# Patient Record
Sex: FEMALE | Race: WHITE | NOT HISPANIC OR LATINO | Employment: FULL TIME | URBAN - NONMETROPOLITAN AREA
[De-identification: names, ages, dates, MRNs, and addresses within clinical notes are randomized per-mention and may not be internally consistent; named-entity substitution may affect disease eponyms.]

---

## 2017-06-05 ENCOUNTER — HOSPITAL ENCOUNTER (OUTPATIENT)
Facility: HOSPITAL | Age: 37
Setting detail: OBSERVATION
Discharge: HOME | End: 2017-06-06
Attending: EMERGENCY MEDICINE | Admitting: HOSPITALIST

## 2017-06-05 ENCOUNTER — APPOINTMENT (OUTPATIENT)
Dept: GENERAL RADIOLOGY | Facility: HOSPITAL | Age: 37
End: 2017-06-05

## 2017-06-05 DIAGNOSIS — R07.9 CHEST PAIN OF UNCERTAIN ETIOLOGY: Primary | ICD-10-CM

## 2017-06-05 LAB
ALBUMIN SERPL-MCNC: 4.2 G/DL (ref 3.5–5)
ALBUMIN/GLOB SERPL: 1.3 G/DL (ref 1.5–2.5)
ALP SERPL-CCNC: 54 U/L (ref 35–104)
ALT SERPL W P-5'-P-CCNC: 14 U/L (ref 10–36)
AMYLASE SERPL-CCNC: 75 U/L (ref 28–100)
ANION GAP SERPL CALCULATED.3IONS-SCNC: 4 MMOL/L (ref 3.6–11.2)
AST SERPL-CCNC: 24 U/L (ref 10–30)
BASOPHILS # BLD AUTO: 0.07 10*3/MM3 (ref 0–0.3)
BASOPHILS NFR BLD AUTO: 0.9 % (ref 0–2)
BILIRUB SERPL-MCNC: 1.2 MG/DL (ref 0.2–1.8)
BUN BLD-MCNC: 14 MG/DL (ref 7–21)
BUN/CREAT SERPL: 19.2 (ref 7–25)
CALCIUM SPEC-SCNC: 9.6 MG/DL (ref 7.7–10)
CHLORIDE SERPL-SCNC: 111 MMOL/L (ref 99–112)
CK MB SERPL-CCNC: 0.21 NG/ML (ref 0–5)
CK MB SERPL-CCNC: <0.18 NG/ML (ref 0–5)
CK MB SERPL-RTO: 0.4 % (ref 0–3)
CK MB SERPL-RTO: NORMAL % (ref 0–3)
CK SERPL-CCNC: 44 U/L (ref 24–173)
CK SERPL-CCNC: 52 U/L (ref 24–173)
CO2 SERPL-SCNC: 26 MMOL/L (ref 24.3–31.9)
CREAT BLD-MCNC: 0.73 MG/DL (ref 0.43–1.29)
D DIMER PPP FEU-MCNC: 0.28 MG/L (FEU) (ref 0–0.5)
DEPRECATED RDW RBC AUTO: 44.1 FL (ref 37–54)
EOSINOPHIL # BLD AUTO: 0.19 10*3/MM3 (ref 0–0.7)
EOSINOPHIL NFR BLD AUTO: 2.4 % (ref 0–5)
ERYTHROCYTE [DISTWIDTH] IN BLOOD BY AUTOMATED COUNT: 13.7 % (ref 11.5–14.5)
GFR SERPL CREATININE-BSD FRML MDRD: 90 ML/MIN/1.73
GLOBULIN UR ELPH-MCNC: 3.2 GM/DL
GLUCOSE BLD-MCNC: 97 MG/DL (ref 70–110)
HBA1C MFR BLD: 5.3 % (ref 4.5–5.7)
HCT VFR BLD AUTO: 42.2 % (ref 37–47)
HGB BLD-MCNC: 14.3 G/DL (ref 12–16)
IMM GRANULOCYTES # BLD: 0.03 10*3/MM3 (ref 0–0.03)
IMM GRANULOCYTES NFR BLD: 0.4 % (ref 0–0.5)
LIPASE SERPL-CCNC: 39 U/L (ref 13–60)
LYMPHOCYTES # BLD AUTO: 2.1 10*3/MM3 (ref 1–3)
LYMPHOCYTES NFR BLD AUTO: 26.1 % (ref 21–51)
MCH RBC QN AUTO: 30.2 PG (ref 27–33)
MCHC RBC AUTO-ENTMCNC: 33.9 G/DL (ref 33–37)
MCV RBC AUTO: 89.2 FL (ref 80–94)
MONOCYTES # BLD AUTO: 0.46 10*3/MM3 (ref 0.1–0.9)
MONOCYTES NFR BLD AUTO: 5.7 % (ref 0–10)
MYOGLOBIN SERPL-MCNC: 16 NG/ML (ref 0–109)
MYOGLOBIN SERPL-MCNC: 20 NG/ML (ref 0–109)
NEUTROPHILS # BLD AUTO: 5.19 10*3/MM3 (ref 1.4–6.5)
NEUTROPHILS NFR BLD AUTO: 64.5 % (ref 30–70)
OSMOLALITY SERPL CALC.SUM OF ELEC: 281.6 MOSM/KG (ref 273–305)
PLATELET # BLD AUTO: 198 10*3/MM3 (ref 130–400)
PMV BLD AUTO: 11.4 FL (ref 6–10)
POTASSIUM BLD-SCNC: 3.8 MMOL/L (ref 3.5–5.3)
PROT SERPL-MCNC: 7.4 G/DL (ref 6–8)
RBC # BLD AUTO: 4.73 10*6/MM3 (ref 4.2–5.4)
SODIUM BLD-SCNC: 141 MMOL/L (ref 135–153)
TROPONIN I SERPL-MCNC: <0.006 NG/ML
TROPONIN I SERPL-MCNC: <0.006 NG/ML
WBC NRBC COR # BLD: 8.04 10*3/MM3 (ref 4.5–12.5)

## 2017-06-05 PROCEDURE — 93010 ELECTROCARDIOGRAM REPORT: CPT | Performed by: INTERNAL MEDICINE

## 2017-06-05 PROCEDURE — 99220 PR INITIAL OBSERVATION CARE/DAY 70 MINUTES: CPT | Performed by: HOSPITALIST

## 2017-06-05 PROCEDURE — G0378 HOSPITAL OBSERVATION PER HR: HCPCS

## 2017-06-05 PROCEDURE — 93005 ELECTROCARDIOGRAM TRACING: CPT | Performed by: EMERGENCY MEDICINE

## 2017-06-05 PROCEDURE — 71010 HC CHEST PA OR AP: CPT

## 2017-06-05 PROCEDURE — 85379 FIBRIN DEGRADATION QUANT: CPT | Performed by: EMERGENCY MEDICINE

## 2017-06-05 PROCEDURE — 82150 ASSAY OF AMYLASE: CPT | Performed by: EMERGENCY MEDICINE

## 2017-06-05 PROCEDURE — 99285 EMERGENCY DEPT VISIT HI MDM: CPT

## 2017-06-05 PROCEDURE — 82550 ASSAY OF CK (CPK): CPT | Performed by: EMERGENCY MEDICINE

## 2017-06-05 PROCEDURE — 83036 HEMOGLOBIN GLYCOSYLATED A1C: CPT | Performed by: HOSPITALIST

## 2017-06-05 PROCEDURE — 80053 COMPREHEN METABOLIC PANEL: CPT | Performed by: EMERGENCY MEDICINE

## 2017-06-05 PROCEDURE — 83874 ASSAY OF MYOGLOBIN: CPT | Performed by: EMERGENCY MEDICINE

## 2017-06-05 PROCEDURE — 71010 XR CHEST 1 VW: CPT | Performed by: RADIOLOGY

## 2017-06-05 PROCEDURE — 96361 HYDRATE IV INFUSION ADD-ON: CPT

## 2017-06-05 PROCEDURE — 83690 ASSAY OF LIPASE: CPT | Performed by: EMERGENCY MEDICINE

## 2017-06-05 PROCEDURE — 25010000002 LORAZEPAM PER 2 MG: Performed by: EMERGENCY MEDICINE

## 2017-06-05 PROCEDURE — 85025 COMPLETE CBC W/AUTO DIFF WBC: CPT | Performed by: EMERGENCY MEDICINE

## 2017-06-05 PROCEDURE — 25010000002 HEPARIN (PORCINE) PER 1000 UNITS: Performed by: HOSPITALIST

## 2017-06-05 PROCEDURE — 96372 THER/PROPH/DIAG INJ SC/IM: CPT

## 2017-06-05 PROCEDURE — 82553 CREATINE MB FRACTION: CPT | Performed by: EMERGENCY MEDICINE

## 2017-06-05 PROCEDURE — 84484 ASSAY OF TROPONIN QUANT: CPT | Performed by: EMERGENCY MEDICINE

## 2017-06-05 PROCEDURE — 96374 THER/PROPH/DIAG INJ IV PUSH: CPT

## 2017-06-05 RX ORDER — MORPHINE SULFATE 2 MG/ML
1 INJECTION, SOLUTION INTRAMUSCULAR; INTRAVENOUS EVERY 4 HOURS PRN
Status: DISCONTINUED | OUTPATIENT
Start: 2017-06-05 | End: 2017-06-06 | Stop reason: HOSPADM

## 2017-06-05 RX ORDER — SODIUM CHLORIDE 0.9 % (FLUSH) 0.9 %
1-10 SYRINGE (ML) INJECTION AS NEEDED
Status: DISCONTINUED | OUTPATIENT
Start: 2017-06-05 | End: 2017-06-06 | Stop reason: HOSPADM

## 2017-06-05 RX ORDER — ACETAMINOPHEN 325 MG/1
650 TABLET ORAL EVERY 6 HOURS PRN
Status: DISCONTINUED | OUTPATIENT
Start: 2017-06-05 | End: 2017-06-06 | Stop reason: HOSPADM

## 2017-06-05 RX ORDER — NALOXONE HCL 0.4 MG/ML
0.4 VIAL (ML) INJECTION
Status: DISCONTINUED | OUTPATIENT
Start: 2017-06-05 | End: 2017-06-06 | Stop reason: HOSPADM

## 2017-06-05 RX ORDER — SODIUM CHLORIDE 9 MG/ML
75 INJECTION, SOLUTION INTRAVENOUS CONTINUOUS
Status: DISCONTINUED | OUTPATIENT
Start: 2017-06-05 | End: 2017-06-06 | Stop reason: HOSPADM

## 2017-06-05 RX ORDER — NITROGLYCERIN 0.4 MG/1
0.4 TABLET SUBLINGUAL
Status: DISCONTINUED | OUTPATIENT
Start: 2017-06-05 | End: 2017-06-06 | Stop reason: HOSPADM

## 2017-06-05 RX ORDER — SODIUM CHLORIDE 9 MG/ML
75 INJECTION, SOLUTION INTRAVENOUS CONTINUOUS
Status: DISCONTINUED | OUTPATIENT
Start: 2017-06-05 | End: 2017-06-06

## 2017-06-05 RX ORDER — ONDANSETRON 2 MG/ML
4 INJECTION INTRAMUSCULAR; INTRAVENOUS EVERY 6 HOURS PRN
Status: DISCONTINUED | OUTPATIENT
Start: 2017-06-05 | End: 2017-06-06 | Stop reason: HOSPADM

## 2017-06-05 RX ORDER — LORAZEPAM 2 MG/ML
INJECTION INTRAMUSCULAR
Status: DISPENSED
Start: 2017-06-05 | End: 2017-06-06

## 2017-06-05 RX ORDER — HEPARIN SODIUM 5000 [USP'U]/ML
5000 INJECTION, SOLUTION INTRAVENOUS; SUBCUTANEOUS EVERY 12 HOURS SCHEDULED
Status: DISCONTINUED | OUTPATIENT
Start: 2017-06-05 | End: 2017-06-06 | Stop reason: HOSPADM

## 2017-06-05 RX ORDER — LORAZEPAM 2 MG/ML
1 INJECTION INTRAMUSCULAR ONCE
Status: COMPLETED | OUTPATIENT
Start: 2017-06-05 | End: 2017-06-05

## 2017-06-05 RX ORDER — ASPIRIN 81 MG/1
324 TABLET, CHEWABLE ORAL ONCE
Status: COMPLETED | OUTPATIENT
Start: 2017-06-05 | End: 2017-06-05

## 2017-06-05 RX ADMIN — NITROGLYCERIN 0.4 MG: 0.4 TABLET SUBLINGUAL at 18:29

## 2017-06-05 RX ADMIN — SODIUM CHLORIDE 125 ML/HR: 9 INJECTION, SOLUTION INTRAVENOUS at 18:50

## 2017-06-05 RX ADMIN — SODIUM CHLORIDE 75 ML/HR: 9 INJECTION, SOLUTION INTRAVENOUS at 22:59

## 2017-06-05 RX ADMIN — ASPIRIN 324 MG: 81 TABLET, CHEWABLE ORAL at 18:25

## 2017-06-05 RX ADMIN — SODIUM CHLORIDE 500 ML: 9 INJECTION, SOLUTION INTRAVENOUS at 18:25

## 2017-06-05 RX ADMIN — LORAZEPAM 1 MG: 2 INJECTION INTRAMUSCULAR; INTRAVENOUS at 18:42

## 2017-06-05 RX ADMIN — HEPARIN SODIUM 5000 UNITS: 5000 INJECTION, SOLUTION INTRAVENOUS; SUBCUTANEOUS at 22:28

## 2017-06-05 RX ADMIN — ACETAMINOPHEN 650 MG: 325 TABLET ORAL at 20:46

## 2017-06-05 NOTE — ED NOTES
"Pt reports midsternal chest pain she describes as pressure that has been intermittent x 2 days but seems to be getting worse and having \"metallic taste in mouth and lips tingling\"     Cherry Borrego RN  06/05/17 1812       Cherry Borrego RN  06/05/17 1815    "

## 2017-06-05 NOTE — ED NOTES
Patient lying in bed resting with HOB elevated. Mother noted to be at bedside. Patient states that she is not having any more chest pain or tightness reported. States that she feels some better. Mother noted to be at bedside. Lights dimmed. Safety precautions in place. Call light within reach.      Arin Tang RN  06/05/17 1956

## 2017-06-05 NOTE — ED NOTES
EKG performed by [facility] at 1726 shown to Dr. Romero.     Pradeep Golden  06/05/17 8316

## 2017-06-05 NOTE — ED PROVIDER NOTES
"Subjective   HPI Comments: Patient is 36-year-old white female who presents here today with chief complaint of chest pain, onset of this episode was about an hour and a half to 2 hours ago.  She reports that off and on for the past few days she is intermittently had some substernal discomfort that she describes as a 'grabbing' type of sensation.  It has not been severe or lasted longer than a couple of minutes until this afternoon.  This afternoon  Through the grocery store she developed severe substernal chest pain that she described as initially being sharp but then immediately changed to a pressure type sensation.  Some mild shortness of breath and mild nausea, some diaphoresis.  No vomiting, no radiation of this discomfort.  It is not as severe as it was at the onset, but it has been present constantly for 1/2-2 hours.  She denies any other associated symptoms or complaints.    Patient initially denies any significant past medical history stating that she only goes a nurse practitioner every now and then for a sinus infection for similar illness.  Later her family arrives and describes to me that she has an unspecified kidney disease for which she goes to Dr. Mulligan, urologist in [place], every 3 months or so and has her kidney \"washed out\".  Patient reports that she does not take any medications regularly.    Social history, the patient denies alcohol or tobacco use.  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  Patient reports that she is adopted.    Family history is notable for premature coronary artery disease in one of her brothers, who has had coronary artery stenting.  Also with coronary artery disease apparently in her biological father and several of his relatives.    Patient is a 36 y.o. female presenting with chest pain.   History provided by:  Patient  Chest Pain   Associated symptoms: diaphoresis, nausea and shortness of breath    Associated symptoms: no abdominal pain, no back " pain, no claudication, no cough, no dizziness, no fever, no headache, no heartburn, no lower extremity edema, no near-syncope, no palpitations, no syncope and no vomiting    Risk factors: birth control (patient has a Mirena IUD.)        Review of Systems   Constitutional: Positive for diaphoresis. Negative for activity change, appetite change and fever.   HENT: Negative for congestion, ear discharge, sinus pressure and sore throat.    Eyes: Negative for photophobia and pain.   Respiratory: Positive for shortness of breath. Negative for cough, wheezing and stridor.    Cardiovascular: Positive for chest pain. Negative for palpitations, claudication, leg swelling, syncope and near-syncope.   Gastrointestinal: Positive for nausea. Negative for abdominal distention, abdominal pain, diarrhea, heartburn and vomiting.   Endocrine: Negative for polydipsia and polyphagia.   Genitourinary: Negative for difficulty urinating and flank pain.   Musculoskeletal: Negative for arthralgias, back pain, myalgias, neck pain and neck stiffness.   Skin: Negative for color change and rash.   Neurological: Negative for dizziness, seizures, syncope and headaches.   Psychiatric/Behavioral: Negative for confusion.   All other systems reviewed and are negative.      Past Medical History:   Diagnosis Date   • Kidney disease     right side        No Known Allergies    Past Surgical History:   Procedure Laterality Date   • KIDNEY SURGERY  2015    right side        Family History   Problem Relation Age of Onset   • Heart disease Mother    • Hypertension Mother    • Cancer Mother    • Heart disease Father    • Kidney disease Father    • Cancer Father        Social History     Social History   • Marital status:      Spouse name: N/A   • Number of children: N/A   • Years of education: N/A     Social History Main Topics   • Smoking status: Never Smoker   • Smokeless tobacco: None   • Alcohol use No   • Drug use: No   • Sexual activity: Defer      Other Topics Concern   • None     Social History Narrative   • None           Objective   Physical Exam   Constitutional: She is oriented to person, place, and time. She appears well-developed and well-nourished. No distress.   A very pleasant white female who appears anxious.  She does not otherwise appear to be in acute distress.   HENT:   Head: Normocephalic and atraumatic.   Eyes: EOM are normal. Pupils are equal, round, and reactive to light. No scleral icterus.   Neck: Normal range of motion. Neck supple. No rigidity. No tracheal deviation present.   Cardiovascular: Normal rate, regular rhythm and intact distal pulses.    Pulmonary/Chest: Effort normal and breath sounds normal. No respiratory distress. She exhibits tenderness (there is tenderness to palpation at bilateral sternal border costochondral junctions.  This does not seem to precisely reproduce her discomfort.).   Abdominal: Soft. Bowel sounds are normal. There is no tenderness. There is no rebound and no guarding.   Musculoskeletal: Normal range of motion. She exhibits no tenderness.   Neurological: She is alert and oriented to person, place, and time. She has normal strength. No sensory deficit. She exhibits normal muscle tone. Coordination normal. GCS eye subscore is 4. GCS verbal subscore is 5. GCS motor subscore is 6.   Skin: Skin is warm and dry. She is not diaphoretic. No cyanosis. No pallor.   Psychiatric: Her behavior is normal.   Vitals reviewed.      Procedures  EKG shows sinus tachycardia with a rate of 101. No apparent acute ischemia.  XR Chest 1 View   ED Interpretation   No apparent acute disease pending radiologist review.        Results for orders placed or performed during the hospital encounter of 06/05/17   Comprehensive Metabolic Panel   Result Value Ref Range    Glucose 97 70 - 110 mg/dL    BUN 14 7 - 21 mg/dL    Creatinine 0.73 0.43 - 1.29 mg/dL    Sodium 141 135 - 153 mmol/L    Potassium 3.8 3.5 - 5.3 mmol/L    Chloride 111  99 - 112 mmol/L    CO2 26.0 24.3 - 31.9 mmol/L    Calcium 9.6 7.7 - 10.0 mg/dL    Total Protein 7.4 6.0 - 8.0 g/dL    Albumin 4.20 3.50 - 5.00 g/dL    ALT (SGPT) 14 10 - 36 U/L    AST (SGOT) 24 10 - 30 U/L    Alkaline Phosphatase 54 35 - 104 U/L    Total Bilirubin 1.2 0.2 - 1.8 mg/dL    eGFR Non African Amer 90 >60 mL/min/1.73    Globulin 3.2 gm/dL    A/G Ratio 1.3 (L) 1.5 - 2.5 g/dL    BUN/Creatinine Ratio 19.2 7.0 - 25.0    Anion Gap 4.0 3.6 - 11.2 mmol/L   D-dimer, Quantitative   Result Value Ref Range    D-Dimer, Quantitative 0.28 0.00 - 0.50 mg/L (FEU)   Amylase   Result Value Ref Range    Amylase 75 28 - 100 U/L   Lipase   Result Value Ref Range    Lipase 39 13 - 60 U/L   CK   Result Value Ref Range    Creatine Kinase 52 24 - 173 U/L   Myoglobin, Serum   Result Value Ref Range    Myoglobin 16.0 0.0 - 109.0 ng/mL   CK-MB   Result Value Ref Range    CKMB 0.21 0.00 - 5.00 ng/mL   Troponin   Result Value Ref Range    Troponin I <0.006 <=0.040 ng/mL   CBC Auto Differential   Result Value Ref Range    WBC 8.04 4.50 - 12.50 10*3/mm3    RBC 4.73 4.20 - 5.40 10*6/mm3    Hemoglobin 14.3 12.0 - 16.0 g/dL    Hematocrit 42.2 37.0 - 47.0 %    MCV 89.2 80.0 - 94.0 fL    MCH 30.2 27.0 - 33.0 pg    MCHC 33.9 33.0 - 37.0 g/dL    RDW 13.7 11.5 - 14.5 %    RDW-SD 44.1 37.0 - 54.0 fl    MPV 11.4 (H) 6.0 - 10.0 fL    Platelets 198 130 - 400 10*3/mm3    Neutrophil % 64.5 30.0 - 70.0 %    Lymphocyte % 26.1 21.0 - 51.0 %    Monocyte % 5.7 0.0 - 10.0 %    Eosinophil % 2.4 0.0 - 5.0 %    Basophil % 0.9 0.0 - 2.0 %    Immature Grans % 0.4 0.0 - 0.5 %    Neutrophils, Absolute 5.19 1.40 - 6.50 10*3/mm3    Lymphocytes, Absolute 2.10 1.00 - 3.00 10*3/mm3    Monocytes, Absolute 0.46 0.10 - 0.90 10*3/mm3    Eosinophils, Absolute 0.19 0.00 - 0.70 10*3/mm3    Basophils, Absolute 0.07 0.00 - 0.30 10*3/mm3    Immature Grans, Absolute 0.03 0.00 - 0.03 10*3/mm3   Osmolality, Calculated   Result Value Ref Range    Osmolality Calc 281.6 273.0 - 305.0  mOsm/kg   CK-MB Index   Result Value Ref Range    CK-MB Index 0.4 0.0 - 3.0 %   CK   Result Value Ref Range    Creatine Kinase 44 24 - 173 U/L   Myoglobin, Serum   Result Value Ref Range    Myoglobin 20.0 0.0 - 109.0 ng/mL   CK-MB   Result Value Ref Range    CKMB <0.18 0.00 - 5.00 ng/mL   Troponin   Result Value Ref Range    Troponin I <0.006 <=0.040 ng/mL   CK-MB Index   Result Value Ref Range    CK-MB Index  0.0 - 3.0 %   Hemoglobin A1c   Result Value Ref Range    Hemoglobin A1C 5.30 4.50 - 5.70 %              ED Course  ED Course   Patient's emergency department stay has not been terribly difficult.  She did have an exaggerated response to a sublingual nitroglycerin tablet, in which she became extremely anxious, tachycardic, hyperventilatory and complained of feeling numb and tingly all over.  Her blood pressure remained good throughout this but she did become very tachycardic, showing a sinus tachycardia as high as the 160s to 170 range briefly on the monitor.  A second EKG was obtained but her heart rate was already coming down this showed sinus tachycardia with a rate of 135, baseline artifact, nonspecific ST-T  abnormality. She had a milligram of intravenous Ativan and has remained comfortable and calm ever since that time.  She voices that her chest discomfort is much improved though it is not completely resolved.  Other than this episode with the sublingual nitroglycerin her emergency department stay has been entirely uneventful.  I discussed the case with Dr. Hanna, and he is admitting the patient to the hospital under his service for further evaluation and care.  Patient and her family agree with this plan.              MDM    Final diagnoses:   Chest pain of uncertain etiology             Please note that portions of this note were completed with a voice recognition program. Efforts were made to edit the dictations, but occasionally words are mistranscribed.       Sunil Cabrales MD  06/05/17  [id number]

## 2017-06-06 ENCOUNTER — APPOINTMENT (OUTPATIENT)
Dept: CARDIOLOGY | Facility: HOSPITAL | Age: 37
End: 2017-06-06
Attending: HOSPITALIST

## 2017-06-06 ENCOUNTER — APPOINTMENT (OUTPATIENT)
Dept: NUCLEAR MEDICINE | Facility: HOSPITAL | Age: 37
End: 2017-06-06
Attending: HOSPITALIST

## 2017-06-06 VITALS
SYSTOLIC BLOOD PRESSURE: 113 MMHG | BODY MASS INDEX: 20.78 KG/M2 | WEIGHT: 121.7 LBS | TEMPERATURE: 98.4 F | RESPIRATION RATE: 20 BRPM | HEART RATE: 92 BPM | OXYGEN SATURATION: 98 % | DIASTOLIC BLOOD PRESSURE: 71 MMHG | HEIGHT: 64 IN

## 2017-06-06 LAB
ANION GAP SERPL CALCULATED.3IONS-SCNC: 1.3 MMOL/L (ref 3.6–11.2)
B-HCG UR QL: NEGATIVE
BH CV ECHO MEAS - % IVS THICK: 51.4 %
BH CV ECHO MEAS - % LVPW THICK: 9.8 %
BH CV ECHO MEAS - ACS: 1.8 CM
BH CV ECHO MEAS - AO ROOT AREA (BSA CORRECTED): 1.7
BH CV ECHO MEAS - AO ROOT AREA: 5.4 CM^2
BH CV ECHO MEAS - AO ROOT DIAM: 2.6 CM
BH CV ECHO MEAS - BSA(HAYCOCK): 1.6 M^2
BH CV ECHO MEAS - BSA: 1.6 M^2
BH CV ECHO MEAS - BZI_BMI: 20.8 KILOGRAMS/M^2
BH CV ECHO MEAS - BZI_METRIC_HEIGHT: 162.6 CM
BH CV ECHO MEAS - BZI_METRIC_WEIGHT: 54.9 KG
BH CV ECHO MEAS - CONTRAST EF 4CH: 65.1 ML/M^2
BH CV ECHO MEAS - EDV(CUBED): 55.3 ML
BH CV ECHO MEAS - EDV(MOD-SP4): 43 ML
BH CV ECHO MEAS - EDV(TEICH): 62.3 ML
BH CV ECHO MEAS - EF(CUBED): 64.1 %
BH CV ECHO MEAS - EF(MOD-SP4): 65.1 %
BH CV ECHO MEAS - EF(TEICH): 56.4 %
BH CV ECHO MEAS - ESV(CUBED): 19.8 ML
BH CV ECHO MEAS - ESV(MOD-SP4): 15 ML
BH CV ECHO MEAS - ESV(TEICH): 27.2 ML
BH CV ECHO MEAS - FS: 28.9 %
BH CV ECHO MEAS - IVS/LVPW: 0.92
BH CV ECHO MEAS - IVSD: 0.97 CM
BH CV ECHO MEAS - IVSS: 1.5 CM
BH CV ECHO MEAS - LA DIMENSION: 2.2 CM
BH CV ECHO MEAS - LA/AO: 0.84
BH CV ECHO MEAS - LV DIASTOLIC VOL/BSA (35-75): 27.2 ML/M^2
BH CV ECHO MEAS - LV MASS(C)D: 119.9 GRAMS
BH CV ECHO MEAS - LV MASS(C)DI: 75.9 GRAMS/M^2
BH CV ECHO MEAS - LV MASS(C)S: 110.3 GRAMS
BH CV ECHO MEAS - LV MASS(C)SI: 69.8 GRAMS/M^2
BH CV ECHO MEAS - LV SYSTOLIC VOL/BSA (12-30): 9.5 ML/M^2
BH CV ECHO MEAS - LVIDD: 3.8 CM
BH CV ECHO MEAS - LVIDS: 2.7 CM
BH CV ECHO MEAS - LVLD AP4: 6.7 CM
BH CV ECHO MEAS - LVLS AP4: 6 CM
BH CV ECHO MEAS - LVOT AREA (M): 2.8 CM^2
BH CV ECHO MEAS - LVOT AREA: 2.7 CM^2
BH CV ECHO MEAS - LVOT DIAM: 1.9 CM
BH CV ECHO MEAS - LVPWD: 1.1 CM
BH CV ECHO MEAS - LVPWS: 1.2 CM
BH CV ECHO MEAS - MV A MAX VEL: 88.8 CM/SEC
BH CV ECHO MEAS - MV E MAX VEL: 97.2 CM/SEC
BH CV ECHO MEAS - MV E/A: 1.1
BH CV ECHO MEAS - PA ACC SLOPE: 899.5 CM/SEC^2
BH CV ECHO MEAS - PA ACC TIME: 0.14 SEC
BH CV ECHO MEAS - PA PR(ACCEL): 17.2 MMHG
BH CV ECHO MEAS - RVDD: 1.3 CM
BH CV ECHO MEAS - SI(CUBED): 22.4 ML/M^2
BH CV ECHO MEAS - SI(MOD-SP4): 17.7 ML/M^2
BH CV ECHO MEAS - SI(TEICH): 22.2 ML/M^2
BH CV ECHO MEAS - SV(CUBED): 35.4 ML
BH CV ECHO MEAS - SV(MOD-SP4): 28 ML
BH CV ECHO MEAS - SV(TEICH): 35.1 ML
BH CV NUCLEAR PRIOR STUDY: 2
BH CV STRESS BP STAGE 1: NORMAL
BH CV STRESS BP STAGE 2: NORMAL
BH CV STRESS BP STAGE 3: NORMAL
BH CV STRESS BP STAGE 4: NORMAL
BH CV STRESS DURATION MIN STAGE 1: 3
BH CV STRESS DURATION MIN STAGE 2: 3
BH CV STRESS DURATION MIN STAGE 3: 3
BH CV STRESS DURATION MIN STAGE 4: 1
BH CV STRESS DURATION SEC STAGE 1: 0
BH CV STRESS DURATION SEC STAGE 2: 0
BH CV STRESS DURATION SEC STAGE 3: 0
BH CV STRESS DURATION SEC STAGE 4: 30
BH CV STRESS GRADE STAGE 1: 10
BH CV STRESS GRADE STAGE 2: 12
BH CV STRESS GRADE STAGE 3: 14
BH CV STRESS GRADE STAGE 4: 16
BH CV STRESS HR STAGE 1: 120
BH CV STRESS HR STAGE 2: 120
BH CV STRESS HR STAGE 3: 140
BH CV STRESS HR STAGE 4: 158
BH CV STRESS METS STAGE 1: 4.6
BH CV STRESS METS STAGE 2: 7
BH CV STRESS METS STAGE 3: 10.1
BH CV STRESS METS STAGE 4: 12.8
BH CV STRESS PROTOCOL 1: NORMAL
BH CV STRESS RECOVERY BP: NORMAL MMHG
BH CV STRESS RECOVERY HR: 114 BPM
BH CV STRESS SPEED STAGE 1: 1.7
BH CV STRESS SPEED STAGE 2: 2.5
BH CV STRESS SPEED STAGE 3: 3.4
BH CV STRESS SPEED STAGE 4: 4.2
BH CV STRESS STAGE 1: 1
BH CV STRESS STAGE 2: 2
BH CV STRESS STAGE 3: 3
BH CV STRESS STAGE 4: 4
BUN BLD-MCNC: 11 MG/DL (ref 7–21)
BUN/CREAT SERPL: 16.4 (ref 7–25)
CALCIUM SPEC-SCNC: 9 MG/DL (ref 7.7–10)
CHLORIDE SERPL-SCNC: 112 MMOL/L (ref 99–112)
CHOLEST SERPL-MCNC: 176 MG/DL (ref 0–200)
CK MB SERPL-CCNC: 0.24 NG/ML (ref 0–5)
CK MB SERPL-CCNC: <0.18 NG/ML (ref 0–5)
CK MB SERPL-CCNC: <0.18 NG/ML (ref 0–5)
CK MB SERPL-RTO: 0.6 % (ref 0–3)
CK MB SERPL-RTO: NORMAL % (ref 0–3)
CK MB SERPL-RTO: NORMAL % (ref 0–3)
CK SERPL-CCNC: 41 U/L (ref 24–173)
CK SERPL-CCNC: 42 U/L (ref 24–173)
CK SERPL-CCNC: 42 U/L (ref 24–173)
CO2 SERPL-SCNC: 28.7 MMOL/L (ref 24.3–31.9)
CREAT BLD-MCNC: 0.67 MG/DL (ref 0.43–1.29)
DEPRECATED RDW RBC AUTO: 44.2 FL (ref 37–54)
ERYTHROCYTE [DISTWIDTH] IN BLOOD BY AUTOMATED COUNT: 13.7 % (ref 11.5–14.5)
GFR SERPL CREATININE-BSD FRML MDRD: 99 ML/MIN/1.73
GLUCOSE BLD-MCNC: 96 MG/DL (ref 70–110)
HCT VFR BLD AUTO: 39.7 % (ref 37–47)
HDLC SERPL-MCNC: 59 MG/DL (ref 60–100)
HGB BLD-MCNC: 13.2 G/DL (ref 12–16)
LDLC SERPL CALC-MCNC: 105 MG/DL (ref 0–100)
LDLC/HDLC SERPL: 1.78 {RATIO}
LYMPHOCYTES # BLD MANUAL: 2.69 10*3/MM3 (ref 1–3)
LYMPHOCYTES NFR BLD MANUAL: 30 % (ref 21–51)
LYMPHOCYTES NFR BLD MANUAL: 4 % (ref 0–10)
MAXIMAL PREDICTED HEART RATE: 183 BPM
MCH RBC QN AUTO: 29.9 PG (ref 27–33)
MCHC RBC AUTO-ENTMCNC: 33.2 G/DL (ref 33–37)
MCV RBC AUTO: 89.8 FL (ref 80–94)
MONOCYTES # BLD AUTO: 0.36 10*3/MM3 (ref 0.1–0.9)
MYOGLOBIN SERPL-MCNC: 21 NG/ML (ref 0–109)
MYOGLOBIN SERPL-MCNC: 26 NG/ML (ref 0–109)
MYOGLOBIN SERPL-MCNC: 33 NG/ML (ref 0–109)
NEUTROPHILS # BLD AUTO: 5.91 10*3/MM3 (ref 1.4–6.5)
NEUTROPHILS NFR BLD MANUAL: 66 % (ref 30–70)
OSMOLALITY SERPL CALC.SUM OF ELEC: 282.4 MOSM/KG (ref 273–305)
PERCENT MAX PREDICTED HR: 86.89 %
PLAT MORPH BLD: NORMAL
PLATELET # BLD AUTO: 174 10*3/MM3 (ref 130–400)
PMV BLD AUTO: 11.9 FL (ref 6–10)
POTASSIUM BLD-SCNC: 3.8 MMOL/L (ref 3.5–5.3)
RBC # BLD AUTO: 4.42 10*6/MM3 (ref 4.2–5.4)
RBC MORPH BLD: NORMAL
SODIUM BLD-SCNC: 142 MMOL/L (ref 135–153)
STRESS BASELINE BP: NORMAL MMHG
STRESS BASELINE HR: 102 BPM
STRESS PERCENT HR: 102 %
STRESS POST ESTIMATED WORKLOAD: 12.8 METS
STRESS POST EXERCISE DUR MIN: 10 MIN
STRESS POST EXERCISE DUR SEC: 30 SEC
STRESS POST PEAK BP: NORMAL MMHG
STRESS POST PEAK HR: 159 BPM
STRESS TARGET HR: 156 BPM
TRIGL SERPL-MCNC: 61 MG/DL (ref 0–150)
TROPONIN I SERPL-MCNC: <0.006 NG/ML
TSH SERPL DL<=0.05 MIU/L-ACNC: 4.69 MIU/ML (ref 0.55–4.78)
VLDLC SERPL-MCNC: 12.2 MG/DL
WBC NRBC COR # BLD: 8.95 10*3/MM3 (ref 4.5–12.5)

## 2017-06-06 PROCEDURE — 82553 CREATINE MB FRACTION: CPT | Performed by: HOSPITALIST

## 2017-06-06 PROCEDURE — 85007 BL SMEAR W/DIFF WBC COUNT: CPT | Performed by: HOSPITALIST

## 2017-06-06 PROCEDURE — G0378 HOSPITAL OBSERVATION PER HR: HCPCS

## 2017-06-06 PROCEDURE — 78451 HT MUSCLE IMAGE SPECT SING: CPT

## 2017-06-06 PROCEDURE — 80061 LIPID PANEL: CPT | Performed by: HOSPITALIST

## 2017-06-06 PROCEDURE — 82550 ASSAY OF CK (CPK): CPT | Performed by: HOSPITALIST

## 2017-06-06 PROCEDURE — 80048 BASIC METABOLIC PNL TOTAL CA: CPT | Performed by: HOSPITALIST

## 2017-06-06 PROCEDURE — 78452 HT MUSCLE IMAGE SPECT MULT: CPT | Performed by: INTERNAL MEDICINE

## 2017-06-06 PROCEDURE — 84484 ASSAY OF TROPONIN QUANT: CPT | Performed by: HOSPITALIST

## 2017-06-06 PROCEDURE — 93018 CV STRESS TEST I&R ONLY: CPT | Performed by: INTERNAL MEDICINE

## 2017-06-06 PROCEDURE — 93306 TTE W/DOPPLER COMPLETE: CPT | Performed by: INTERNAL MEDICINE

## 2017-06-06 PROCEDURE — 84443 ASSAY THYROID STIM HORMONE: CPT | Performed by: HOSPITALIST

## 2017-06-06 PROCEDURE — 96372 THER/PROPH/DIAG INJ SC/IM: CPT

## 2017-06-06 PROCEDURE — 93017 CV STRESS TEST TRACING ONLY: CPT

## 2017-06-06 PROCEDURE — 99217 PR OBSERVATION CARE DISCHARGE MANAGEMENT: CPT | Performed by: INTERNAL MEDICINE

## 2017-06-06 PROCEDURE — 93306 TTE W/DOPPLER COMPLETE: CPT

## 2017-06-06 PROCEDURE — 25010000002 HEPARIN (PORCINE) PER 1000 UNITS: Performed by: HOSPITALIST

## 2017-06-06 PROCEDURE — 85027 COMPLETE CBC AUTOMATED: CPT | Performed by: HOSPITALIST

## 2017-06-06 PROCEDURE — 96375 TX/PRO/DX INJ NEW DRUG ADDON: CPT

## 2017-06-06 PROCEDURE — 83874 ASSAY OF MYOGLOBIN: CPT | Performed by: HOSPITALIST

## 2017-06-06 PROCEDURE — 93005 ELECTROCARDIOGRAM TRACING: CPT | Performed by: HOSPITALIST

## 2017-06-06 PROCEDURE — 81025 URINE PREGNANCY TEST: CPT | Performed by: HOSPITALIST

## 2017-06-06 PROCEDURE — A9500 TC99M SESTAMIBI: HCPCS | Performed by: HOSPITALIST

## 2017-06-06 PROCEDURE — 0 TECHNETIUM SESTAMIBI: Performed by: HOSPITALIST

## 2017-06-06 PROCEDURE — 25010000002 MORPHINE PER 10 MG: Performed by: HOSPITALIST

## 2017-06-06 RX ADMIN — Medication 1 DOSE: at 09:40

## 2017-06-06 RX ADMIN — Medication 1 DOSE: at 11:20

## 2017-06-06 RX ADMIN — MORPHINE SULFATE 1 MG: 2 INJECTION, SOLUTION INTRAMUSCULAR; INTRAVENOUS at 15:24

## 2017-06-06 RX ADMIN — HEPARIN SODIUM 5000 UNITS: 5000 INJECTION, SOLUTION INTRAVENOUS; SUBCUTANEOUS at 13:40

## 2017-06-06 NOTE — DISCHARGE SUMMARY
Discharge Summary:    Date of Admission: 6/5/2017  Date of Discharge:  6/6/2017    PCP: Gabby Azevedo MD    DISCHARGE DIAGNOSIS  - Chest pain with mixed typical and atypical features, in the setting of no known cardiac risk factors other than family history of premature CAD; ruled out acute coronary syndrome; status post normal echocardiogram and stress test  - Increased work-related stress, suspect component of anxiety contributing to above symptoms  - Intermittent episodes of sinus tachycardia    SECONDARY DIAGNOSES  Past Medical History:   Diagnosis Date   • Kidney disease     right side        CONSULTS   None    PROCEDURES PERFORMED  Echocardiogram:          HOSPITAL COURSE  Patient is a 36 y.o. female presented to [facility] complaining of intermittent chest discomfort.  Please see the admitting history and physical for further details.      The patient was admitted to the telemetry unit with chest pain.  Patient had some typical and atypical features.  The patient herself has no risk factors for premature coronary artery disease but does have a strong family history.  It was thought that some of the patient's symptoms may be explained by some increased work-related stress and anxiety.  For risk factor stratification hemoglobin A1c and fasting lipid panel were obtained and were found to be unremarkable.  The patient did rule out for acute myocardial infarction and underwent an echocardiogram in addition to a stress test, both of which were felt to be unremarkable.  The patient had 2 episodes of tachycardia today.  One episode occurred shortly after returning from her stress test and the other episode occurred while at rest.  The patient had no other acute complaints or complications.  It was felt that the patient could be discharged home.  She was encouraged to follow-up with her primary care provider. The patient reports that she has cut out caffeine (was previously drinking 2 energy drinks daily).  "Should the patient continue to have tachycardia, her PCP may consider an event monitor.                        CONDITION ON DISCHARGE  Stable.      VITAL SIGNS  /56 (BP Location: Left arm, Patient Position: Lying)  Pulse 95  Temp 98.6 °F (37 °C) (Oral)   Resp 20  Ht 64\" (162.6 cm)  Wt 121 lb 11.2 oz (55.2 kg)  LMP Comment: mirena  SpO2 98%  BMI 20.89 kg/m2     Objective:  General Appearance:  Comfortable, well-appearing and in no acute distress.    Vital signs: (most recent): Blood pressure 110/56, pulse 95, temperature 98.6 °F (37 °C), temperature source Oral, resp. rate 20, height 64\" (162.6 cm), weight 121 lb 11.2 oz (55.2 kg), SpO2 98 %.  Vital signs are normal.    HEENT: Normal HEENT exam.    Lungs:  Normal effort.  Breath sounds clear to auscultation.  No wheezes, rales or rhonchi.    Heart: Normal rate.  S1 normal and S2 normal.  No murmur, gallop or friction rub.   Chest: Symmetric chest wall expansion.   Abdomen: Abdomen is soft and non-distended.  Bowel sounds are normal.   There is no abdominal tenderness.     Extremities: Normal range of motion.  There is no deformity or dependent edema.    Pulses: Distal pulses are intact.    Neurological: Patient is alert and oriented to person, place and time.  Normal strength.    Skin:  Warm and dry.  No rash or ulceration.           Telemetry: Sinus 90s-100    Present during exam: ZOIE Hinkle    DISCHARGE DISPOSITION   Home or Self Care    DISCHARGE MEDICATIONS  There are no discharge medications for this patient.       DISCHARGE DIET  regular diet    ACTIVITY AT DISCHARGE   activity as tolerated    No future appointments.  See primary care provider, Gabby Azevedo MD, in 1-2 weeks    Additional Instructions for the Follow-ups that You Need to Schedule     Discharge Follow-up with PCP    As directed    Follow Up Details:  in approximately 1 week                 TEST  RESULTS PENDING AT DISCHARGE     None       Mara Alexander DO  06/06/17  6:03 " PM      Time: less than 30 minutes.

## 2017-06-06 NOTE — H&P
Hospitalist History and Physical        Patient Identification  Name: Michelle Cabrales  Age/Sex: 36 y.o. female  :  1980        MRN: [id number]  Visit Number: [id number]  PCP: Gabby Azevedo MD          Chief complaint chest pain    History of Present Illness:  Patient is a 36 y.o. female who presents with chest pain of 2 days duration. She reports she has actually been having intermittent chest discomfort for a couple weeks now, but it has gotten significantly worse in the last 2 days. She describes the pain as pressure in sensation, with radiation to her left shoulder and associated nausea, dyspnea and diaphoresis, as well as a metallic taste in her mouth. Today around 4pm, the pain returned but this time did not relent.Thus she came to the ER for further evaluation. She does report increased work-related stress over the last few weeks. She also reports a family history of premature CAD (see below). She does also report some chest discomfort associated with limb movement (such as when turning the wheel while driving) and some chest wall tenderness to palpation, but this discomfort is different than the chest pain she reports above.    In the ED, workup was essentially unremarkable, with normal basic labs and cardiac enzymes and no significant findings on EKG or chest XR. Patient had a bad reaction to nitroglycerin and developed sinus tachycardia up to the 160s with facial flushing, but this has since resolved. She has now arrived on the telemetry floor. She continues to have mild chest pressure at this time but is otherwise asymptomatic with no diaphoresis, nausea or dyspnea.     Review of Systems  Review of Systems   Constitutional: Positive for diaphoresis. Negative for activity change, appetite change, chills, fatigue, fever and unexpected weight change.   HENT: Negative for congestion, postnasal drip, rhinorrhea and sinus pressure.    Eyes: Negative for photophobia, pain, discharge, redness, itching  and visual disturbance.   Respiratory: Positive for shortness of breath. Negative for cough, chest tightness, wheezing and stridor.    Cardiovascular: Positive for chest pain. Negative for palpitations and leg swelling.   Gastrointestinal: Positive for nausea. Negative for abdominal distention, abdominal pain, constipation, diarrhea and vomiting.   Endocrine: Negative for cold intolerance, heat intolerance, polydipsia, polyphagia and polyuria.   Genitourinary: Negative for difficulty urinating, dysuria and hematuria.   Musculoskeletal: Negative for arthralgias, back pain, joint swelling, myalgias, neck pain and neck stiffness.   Skin: Negative for color change, pallor, rash and wound.   Allergic/Immunologic: Negative for environmental allergies, food allergies and immunocompromised state.   Neurological: Negative for dizziness, tremors, syncope, weakness, light-headedness and numbness.   Hematological: Negative for adenopathy. Does not bruise/bleed easily.   Psychiatric/Behavioral: Negative for agitation, behavioral problems and confusion.       History  PAST MEDICAL HISTORY:  History of kidney stones and recurrent UTIs with right kidney having reduced function, though patient states when last checked it was improving.    History reviewed. No pertinent surgical history.      FAMILY HISTORY:  Brother with premature CAD at age 36 s/p stenting. Her father also had premature CAD in his early 40s.    Social History   Substance Use Topics   • Smoking status: Never Smoker   • Smokeless tobacco: None   • Alcohol use No       (Not in a hospital admission)  Allergies:  Review of patient's allergies indicates no known allergies.    Objective     Vital Signs  Temp:  [97.8 °F (36.6 °C)] 97.8 °F (36.6 °C)  Heart Rate:  [] 115  Resp:  [18-20] 20  BP: (107-142)/(59-87) 114/77  Body mass index is 21.46 kg/(m^2).    Physical Exam:  Physical Exam   Constitutional: She is oriented to person, place, and time. She appears  well-developed and well-nourished.   Appears somewhat anxious   HENT:   Head: Normocephalic.   Mouth/Throat: Oropharynx is clear and moist.   Eyes: Conjunctivae and EOM are normal. Pupils are equal, round, and reactive to light.   Neck: Normal range of motion. Neck supple. No JVD present. No thyromegaly present.   Cardiovascular: Regular rhythm and intact distal pulses.    Murmur heard.  Tachycardic   Pulmonary/Chest: Effort normal and breath sounds normal. No respiratory distress. She has no wheezes. She has no rales. She exhibits tenderness.   Abdominal: Soft. Bowel sounds are normal. She exhibits no distension. There is no tenderness.   Musculoskeletal: Normal range of motion. She exhibits no edema or tenderness.   Lymphadenopathy:     She has no cervical adenopathy.   Neurological: She is alert and oriented to person, place, and time.   No focal deficits appreciated   Skin: Skin is warm and dry. No rash noted. No erythema.   Psychiatric: Her behavior is normal. Thought content normal.   Mildly anxious         Results Review:       Lab Results:    Results from last 7 days  Lab Units 06/05/17  1827   WBC 10*3/mm3 8.04   HEMOGLOBIN g/dL 14.3   PLATELETS 10*3/mm3 198           Results from last 7 days  Lab Units 06/05/17  1827   SODIUM mmol/L 141   POTASSIUM mmol/L 3.8   CHLORIDE mmol/L 111   TOTAL CO2 mmol/L 26.0   BUN mg/dL 14   CREATININE mg/dL 0.73   CALCIUM mg/dL 9.6   GLUCOSE mg/dL 97         No results found for: HGBA1C    Results from last 7 days  Lab Units 06/05/17  1827   BILIRUBIN mg/dL 1.2   ALK PHOS U/L 54   AST (SGOT) U/L 24   ALT (SGPT) U/L 14       Results from last 7 days  Lab Units 06/05/17  2027 06/05/17  1827   CK TOTAL U/L 44 52   TROPONIN I ng/mL <0.006 <0.006   CK MB INDEX %  --  0.4   MYOGLOBIN ng/mL 20.0 16.0                   I have reviewed the patient's laboratory results.    Imaging:  Imaging Results (last 72 hours)     Procedure Component Value Units Date/Time    XR Chest 1 View  [[id number]] Resulted:  06/05/17 2042     Updated:  06/05/17 2042      Chest XR, per my personal review, was unremarkable.    I have personally reviewed the patient's radiologic imaging.    EKG: sinus tachycardia, . QTc 427. No overt ST changes to suggest acute ischemia.    I have personally reviewed the patient's EKG.    Assessment/Plan     Active Problems:  - Chest pain with mixed typical and atypical features, in the setting of no known cardiac risk factors other than family history of premature CAD  - Increased work-related stress, suspect component of anxiety contributing to above symptoms  - History of kidney stones and recurrent UTIs with right kidney dysfunction in the past; Creatinine and GFR normal at this time    Plan:  Patient has been admitted to the telemetry floor. Will rule out for acute MI with serial cardiac enzymes. Obtain ECHO in the morning. If she rules out for acute MI, will evaluate further with stress test. In the mean time, will risk stratify by checking A1c and fasting lipid panel.    DVT/GI Prophylaxis: SQ heparin    Estimated Length of Stay <2 midnights    I discussed the patient's findings, assessment and plan with the patient, her friend, and her RN Anna.    Edwardo Hanna MD  06/05/17  9:24 PM

## 2017-06-06 NOTE — ED NOTES
PT BECAME VERY ANXIOUS, SHAKING,TEARFUL REPORTS NUMBNESS AND TINGLING IN LIPS AND HANDS. PT  RESP 26. MD AT BEDSIDE TALKING WITH PT.      Cherry Borrego RN  06/06/17 0717       Cherry Borrego RN  06/06/17 0717

## 2017-06-06 NOTE — PLAN OF CARE
Problem: Patient Care Overview (Adult)  Goal: Plan of Care Review  Outcome: Ongoing (interventions implemented as appropriate)  Goal: Adult Individualization and Mutuality  Outcome: Ongoing (interventions implemented as appropriate)  Goal: Discharge Needs Assessment  Outcome: Ongoing (interventions implemented as appropriate)    Problem: Pain, Acute (Adult)  Goal: Identify Related Risk Factors and Signs and Symptoms  Outcome: Ongoing (interventions implemented as appropriate)  Goal: Acceptable Pain Control/Comfort Level  Outcome: Ongoing (interventions implemented as appropriate)    Problem: Acute Coronary Syndrome (ACS) (Adult)  Goal: Signs and Symptoms of Listed Potential Problems Will be Absent or Manageable (Acute Coronary Syndrome)  Outcome: Ongoing (interventions implemented as appropriate)

## 2018-01-10 ENCOUNTER — TRANSCRIBE ORDERS (OUTPATIENT)
Dept: ADMINISTRATIVE | Facility: HOSPITAL | Age: 38
End: 2018-01-10

## 2018-01-10 DIAGNOSIS — N64.4 MASTODYNIA: Primary | ICD-10-CM

## 2018-01-27 ENCOUNTER — OFFICE VISIT (OUTPATIENT)
Dept: RETAIL CLINIC | Facility: CLINIC | Age: 38
End: 2018-01-27

## 2018-01-27 VITALS
TEMPERATURE: 99.4 F | BODY MASS INDEX: 21.72 KG/M2 | HEART RATE: 115 BPM | HEIGHT: 64 IN | OXYGEN SATURATION: 98 % | RESPIRATION RATE: 20 BRPM | WEIGHT: 127.2 LBS

## 2018-01-27 DIAGNOSIS — J10.1 INFLUENZA B: Primary | ICD-10-CM

## 2018-01-27 LAB
EXPIRATION DATE: ABNORMAL
FLUAV AG NPH QL: NEGATIVE
FLUBV AG NPH QL: POSITIVE
INTERNAL CONTROL: ABNORMAL
Lab: ABNORMAL

## 2018-01-27 PROCEDURE — 87804 INFLUENZA ASSAY W/OPTIC: CPT | Performed by: NURSE PRACTITIONER

## 2018-01-27 PROCEDURE — 99203 OFFICE O/P NEW LOW 30 MIN: CPT | Performed by: NURSE PRACTITIONER

## 2018-01-27 RX ORDER — OSELTAMIVIR PHOSPHATE 75 MG/1
75 CAPSULE ORAL 2 TIMES DAILY
Qty: 10 CAPSULE | Refills: 0 | Status: SHIPPED | OUTPATIENT
Start: 2018-01-27 | End: 2018-02-01

## 2018-01-27 NOTE — PATIENT INSTRUCTIONS

## 2018-01-27 NOTE — PROGRESS NOTES
"Subjective   Michelle Cabrales is a 37 y.o. female.   Chief Complaint   Patient presents with   • Flu Symptoms      Flu Symptoms   This is a new problem. The current episode started in the past 7 days (1.5 days ago). The problem has been waxing and waning. Associated symptoms include arthralgias, chills, congestion (nasal), coughing (non-productive), fatigue, a fever (102.3 last evening orally), headaches and myalgias. Pertinent negatives include no anorexia, chest pain, nausea, rash, sore throat or vomiting. The symptoms are aggravated by coughing. She has tried acetaminophen for the symptoms. The treatment provided mild relief.          Michelle presents to [facility] with cc of flu-like symptoms since Thursday evening, she says she has taken a dose of Tylenol at 0950 this morning.  Reviewed PMF, has not had an annual Flu Vaccine.  See ROS.        The following portions of the patient's history were reviewed and updated as appropriate: allergies, current medications, past family history, past medical history, past social history, past surgical history and problem list.    Review of Systems   Constitutional: Positive for chills, fatigue and fever (102.3 last evening orally).   HENT: Positive for congestion (nasal) and rhinorrhea (clear). Negative for sore throat.    Respiratory: Positive for cough (non-productive). Negative for chest tightness and shortness of breath.    Cardiovascular: Negative for chest pain.   Gastrointestinal: Negative for anorexia, nausea and vomiting.   Musculoskeletal: Positive for arthralgias and myalgias.   Skin: Negative for rash.   Neurological: Positive for headaches.     Pulse 115  Temp 99.4 °F (37.4 °C) (Temporal Artery )   Resp 20  Ht 162.6 cm (64\")  Wt 57.7 kg (127 lb 3.2 oz)  LMP 01/17/2018  SpO2 98%  BMI 21.83 kg/m2    Objective     Current Outpatient Prescriptions:   •  NAPROXEN PO, Take 500 mg by mouth As Needed., Disp: , Rfl:   •  Sertraline HCl (ZOLOFT PO), Take 25 mg by mouth " Daily., Disp: , Rfl:   •  oseltamivir (TAMIFLU) 75 MG capsule, Take 1 capsule by mouth 2 (Two) Times a Day for 5 days., Disp: 10 capsule, Rfl: 0  No Known Allergies    Physical Exam   Constitutional: She is oriented to person, place, and time. She appears well-developed and well-nourished. No distress.   HENT:   Head: Normocephalic and atraumatic.   Right Ear: Tympanic membrane, external ear and ear canal normal.   Left Ear: Tympanic membrane, external ear and ear canal normal.   Nose: Mucosal edema and rhinorrhea (clear) present. Right sinus exhibits no maxillary sinus tenderness and no frontal sinus tenderness. Left sinus exhibits no maxillary sinus tenderness and no frontal sinus tenderness.   Mouth/Throat: Uvula is midline, oropharynx is clear and moist and mucous membranes are normal. No oropharyngeal exudate.   Eyes: Conjunctivae and EOM are normal. Pupils are equal, round, and reactive to light.   Neck: Normal range of motion. Neck supple.   Cardiovascular: Regular rhythm.  Tachycardia present.    Murmur heard.  Michelle says she has been having issues with tachycardia and has an appointment with Cardiology @ [facility] for evaluation   Pulmonary/Chest: Effort normal and breath sounds normal. No respiratory distress.   Abdominal: Soft. Bowel sounds are normal. She exhibits no distension. There is no tenderness.   Musculoskeletal: Normal range of motion.   Lymphadenopathy:     She has no cervical adenopathy.   Neurological: She is alert and oriented to person, place, and time.   Skin: Skin is warm and dry. No rash noted.   Psychiatric: She has a normal mood and affect. Her behavior is normal. Judgment and thought content normal.   Nursing note and vitals reviewed.      Assessment/Plan   Michelle was seen today for flu symptoms.    Diagnoses and all orders for this visit:    Influenza B  -     oseltamivir (TAMIFLU) 75 MG capsule; Take 1 capsule by mouth 2 (Two) Times a Day for 5 days.  -     POC Influenza A / B      Results  for orders placed or performed in visit on 01/27/18   POC Influenza A / B   Result Value Ref Range    Rapid Influenza A Ag negative     Rapid Influenza B Ag positive     Internal Control Passed Passed    Lot Number [id number]     Expiration Date 7/19

## 2018-02-02 ENCOUNTER — CONSULT (OUTPATIENT)
Dept: CARDIOLOGY | Facility: CLINIC | Age: 38
End: 2018-02-02

## 2018-02-02 VITALS
BODY MASS INDEX: 21.75 KG/M2 | HEART RATE: 81 BPM | WEIGHT: 127.4 LBS | SYSTOLIC BLOOD PRESSURE: 109 MMHG | DIASTOLIC BLOOD PRESSURE: 68 MMHG | HEIGHT: 64 IN

## 2018-02-02 DIAGNOSIS — R00.2 PALPITATIONS: Primary | ICD-10-CM

## 2018-02-02 DIAGNOSIS — R07.89 ATYPICAL CHEST PAIN: ICD-10-CM

## 2018-02-02 PROBLEM — R00.0 TACHYCARDIA: Status: RESOLVED | Noted: 2018-02-02 | Resolved: 2018-02-02

## 2018-02-02 PROBLEM — R00.0 TACHYCARDIA: Status: ACTIVE | Noted: 2018-02-02

## 2018-02-02 PROCEDURE — 93000 ELECTROCARDIOGRAM COMPLETE: CPT | Performed by: INTERNAL MEDICINE

## 2018-02-02 PROCEDURE — 99244 OFF/OP CNSLTJ NEW/EST MOD 40: CPT | Performed by: INTERNAL MEDICINE

## 2018-02-02 NOTE — PROGRESS NOTES
"Encounter Date:02/02/2018    Patient ID: Michelle Cabrales is a 37 y.o. female who is a  and resides in [place]    CC/Reason for visit:  Rapid Heart Rate (Consult) and Fatigue            Michelle Cabrales is referred to my office by YANCY Gross for evaluation of palpitations and fatigue. The patient is a 37-year-old female who states that over the last year has experienced chronic fatigue symptoms. She is usually a very energetic and active female who likes to work in her yard [email or web address], etc. She states that over the last year she has become tired and \"whole body weak\".  When she gets home from work, she often times these go to sleep. She denies any other symptoms that would suggest depression, although she has been started on Zoloft which she thinks may help \"a little\". Recently, the patient has been experiencing episodes of rapid palpitations. These episodes make her feel like she is dying. She has a iWatch which records for heart rate to be approximately 150 BPM. Recently, she has been seen in the [facility] emergency room where an EKG showed sinus tachycardia of 115 BPM without ischemic changes. In June 2017, the patient presented to [facility] emergency room with similar complaints. An echocardiogram and nuclear stress test performed then were completely normal. She has recently undergone blood work at YANCY Gross office and the patient reports that the results were essentially normal with exception of mildly elevated cholesterol. The patient was recently diagnosed with influenza B and has recovered well from this.    Cardiac risk factors: None    Review of Systems   Constitution: Positive for malaise/fatigue and weight loss.   Cardiovascular: Positive for chest pain, leg swelling, orthopnea and palpitations.   Respiratory: Positive for shortness of breath.    Musculoskeletal: Positive for arthritis and muscle weakness.   Neurological: " "Positive for excessive daytime sleepiness, dizziness and headaches.   Psychiatric/Behavioral: The patient is nervous/anxious.        The patient's past medical, social, family history and ROS reviewed in the patient's electronic medical record.    Allergies  Review of patient's allergies indicates no known allergies.    Outpatient Prescriptions Marked as Taking for the 2/2/18 encounter (Consult) with Ronn Kaiser IV, MD   Medication Sig Dispense Refill   • Bioflavonoid Products (VITAMIN C PLUS PO) Take  by mouth Daily.     • Cholecalciferol (VITAMIN D3) 5000 units capsule capsule Take 5,000 Units by mouth Daily.     • COCONUT OIL PO Take  by mouth Daily.     • NAPROXEN PO Take 500 mg by mouth As Needed.     • Prenatal Vit-Fe Fumarate-FA (PRENATAL VITAMIN PO) Take  by mouth Daily.     • Sertraline HCl (ZOLOFT PO) Take 25 mg by mouth Daily.           Blood pressure 109/68, pulse 81, height 162.6 cm (64\"), weight 57.8 kg (127 lb 6.4 oz), last menstrual period 01/17/2018.  Body mass index is 21.87 kg/(m^2).    Physical Exam   Constitutional: She is oriented to person, place, and time. She appears well-developed and well-nourished.   HENT:   Head: Normocephalic and atraumatic.   Eyes: Pupils are equal, round, and reactive to light. No scleral icterus.   Neck: No JVD present. Carotid bruit is not present. No thyromegaly present.   Cardiovascular: Normal rate and regular rhythm.  Exam reveals no gallop.    No murmur heard.  Pulmonary/Chest: Effort normal and breath sounds normal.   Abdominal: Soft. She exhibits no distension. There is no hepatosplenomegaly.   Musculoskeletal: She exhibits no edema.   Neurological: She is alert and oriented to person, place, and time.   Skin: Skin is warm and dry.   Psychiatric: She has a normal mood and affect. Her behavior is normal.       Data Review:     ECG 12 Lead  Date/Time: 2/2/2018 11:53 AM  Performed by: RONN KAISER IV  Authorized by: RONN KAISER IV " MILTON KIRBY   Rhythm: sinus rhythm  BPM: 81  Clinical impression: normal ECG        Echo and nuclear stress test results from 6/2017 were reviewed and were normal  EKG performed 1/23/2017 shows sinus tachycardia at 103 bpm but otherwise no abnormality.       Problem List Items Addressed This Visit        Cardiovascular and Mediastinum    Palpitations - Primary    Relevant Orders    Holter Monitor - 72 Hour Up To 21 Days       Nervous and Auditory    Atypical chest pain    Overview     · [facility] ER presentation with chest pain and palpitations, 6/2017  · Exercise nuclear stress (6/6/17): Exercise for 10.5 minutes. Normal perfusion and EKG. LVEF >70%  · Echo (6/6/17): LVEF normal. No valvular abnormality.  · Repeat chest pain and palpitation presentation to [facility] with normal EKG and negative troponins, 1/20/18             Healthy 37-year-old female with a one-year history of vague generalized fatigue symptoms and a 6 month history of recurrent chest discomfort and rapid palpitations. Workup including EKG, echo, and nuclear stress test does far has been unremarkable. There is been no documented arrhythmia appreciated. While not available for my review presently, I believe her lab work including thyroid function study and blood count to be normal. I recommend 14 day Holter monitoring to evaluate her symptomatology. I'll like to see her back after to discuss results. If no arrhythmia present, we will need to consider noncardiac etiologies for the patient's symptoms       · 14 day Holter monitor  · Follow-up with me after  Return in about 4 weeks (around 3/2/2018).      Ronn Kaiser IV, MD  2/2/2018

## 2018-02-16 ENCOUNTER — OFFICE VISIT (OUTPATIENT)
Dept: RETAIL CLINIC | Facility: CLINIC | Age: 38
End: 2018-02-16

## 2018-02-16 VITALS
RESPIRATION RATE: 20 BRPM | BODY MASS INDEX: 21.66 KG/M2 | HEART RATE: 77 BPM | TEMPERATURE: 99 F | WEIGHT: 126.2 LBS | OXYGEN SATURATION: 97 %

## 2018-02-16 DIAGNOSIS — J10.1 INFLUENZA A: ICD-10-CM

## 2018-02-16 DIAGNOSIS — J02.0 STREP PHARYNGITIS: Primary | ICD-10-CM

## 2018-02-16 LAB
EXPIRATION DATE: ABNORMAL
EXPIRATION DATE: ABNORMAL
FLUAV AG NPH QL: POSITIVE
FLUBV AG NPH QL: NEGATIVE
INTERNAL CONTROL: ABNORMAL
INTERNAL CONTROL: ABNORMAL
Lab: ABNORMAL
Lab: ABNORMAL
S PYO AG THROAT QL: POSITIVE

## 2018-02-16 PROCEDURE — 87880 STREP A ASSAY W/OPTIC: CPT | Performed by: NURSE PRACTITIONER

## 2018-02-16 PROCEDURE — 99213 OFFICE O/P EST LOW 20 MIN: CPT | Performed by: NURSE PRACTITIONER

## 2018-02-16 PROCEDURE — 87804 INFLUENZA ASSAY W/OPTIC: CPT | Performed by: NURSE PRACTITIONER

## 2018-02-16 RX ORDER — AMOXICILLIN 875 MG/1
875 TABLET, COATED ORAL 2 TIMES DAILY
Qty: 20 TABLET | Refills: 0 | Status: SHIPPED | OUTPATIENT
Start: 2018-02-16 | End: 2018-02-26

## 2018-02-16 RX ORDER — OSELTAMIVIR PHOSPHATE 75 MG/1
75 CAPSULE ORAL 2 TIMES DAILY
Qty: 10 CAPSULE | Refills: 0 | Status: SHIPPED | OUTPATIENT
Start: 2018-02-16 | End: 2018-02-21

## 2018-02-16 NOTE — PROGRESS NOTES
Subjective   Michelle Cabrales is a 37 y.o. female.   Chief Complaint   Patient presents with   • Fever      Fever    This is a new problem. The current episode started today. The problem has been waxing and waning. The maximum temperature noted was 101 to 101.9 F. The temperature was taken using an oral thermometer. Associated symptoms include congestion (nasal), coughing (non-productive), headaches, muscle aches and a sore throat. Pertinent negatives include no rash. She has tried NSAIDs (had a dose of Motrin at 1 pm today) for the symptoms. The treatment provided mild relief.   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  Reviewed PMFSH.  See ROS.        The following portions of the patient's history were reviewed and updated as appropriate: allergies, current medications, past family history, past medical history, past social history, past surgical history and problem list.    Review of Systems   Constitutional: Positive for chills and fever.   HENT: Positive for congestion (nasal), rhinorrhea (clear) and sore throat.    Respiratory: Positive for cough (non-productive).    Musculoskeletal: Positive for arthralgias and myalgias.   Skin: Negative for rash.   Neurological: Positive for headaches.     Pulse 77  Temp 99 °F (37.2 °C) (Temporal Artery )   Resp 20  Wt 57.2 kg (126 lb 3.2 oz)  LMP 01/17/2018  SpO2 97%  BMI 21.66 kg/m2    Objective     Current Outpatient Prescriptions:   •  Bioflavonoid Products (VITAMIN C PLUS PO), Take  by mouth Daily., Disp: , Rfl:   •  Cholecalciferol (VITAMIN D3) 5000 units capsule capsule, Take 5,000 Units by mouth Daily., Disp: , Rfl:   •  COCONUT OIL PO, Take  by mouth Daily., Disp: , Rfl:   •  NAPROXEN PO, Take 500 mg by mouth As Needed., Disp: , Rfl:   •  Prenatal Vit-Fe Fumarate-FA (PRENATAL VITAMIN PO), Take  by mouth Daily., Disp: , Rfl:   •  Sertraline HCl (ZOLOFT PO),  Take 25 mg by mouth Daily., Disp: , Rfl:   •  amoxicillin (AMOXIL) 875 MG tablet, Take 1 tablet by mouth 2 (Two) Times a Day for 10 days., Disp: 20 tablet, Rfl: 0  •  oseltamivir (TAMIFLU) 75 MG capsule, Take 1 capsule by mouth 2 (Two) Times a Day for 5 days., Disp: 10 capsule, Rfl: 0  No Known Allergies    Physical Exam   Constitutional: She is oriented to person, place, and time. She appears well-developed and well-nourished. No distress.   HENT:   Head: Normocephalic and atraumatic.   Right Ear: Tympanic membrane and external ear normal.   Left Ear: Tympanic membrane and external ear normal.   Nose: Mucosal edema present. Right sinus exhibits no maxillary sinus tenderness and no frontal sinus tenderness. Left sinus exhibits no maxillary sinus tenderness and no frontal sinus tenderness.   Mouth/Throat: Uvula is midline and mucous membranes are normal. Posterior oropharyngeal erythema present. No oropharyngeal exudate. Tonsils are 1+ on the right. Tonsils are 1+ on the left. No tonsillar exudate.   Eyes: Conjunctivae and EOM are normal. Pupils are equal, round, and reactive to light.   Neck: Normal range of motion. Neck supple.   Cardiovascular: Normal rate, regular rhythm and normal heart sounds.    Pulmonary/Chest: Effort normal and breath sounds normal. No respiratory distress.   Abdominal: Soft. Bowel sounds are normal.   Musculoskeletal: Normal range of motion.   Lymphadenopathy:     She has no cervical adenopathy.   Neurological: She is alert and oriented to person, place, and time.   Skin: Skin is warm and dry. No rash noted.   Psychiatric: She has a normal mood and affect. Her behavior is normal. Judgment and thought content normal.   Nursing note and vitals reviewed.      Assessment/Plan   Michelle was seen today for fever.    Diagnoses and all orders for this visit:    Strep pharyngitis  -     POCT rapid strep A  -     amoxicillin (AMOXIL) 875 MG tablet; Take 1 tablet by mouth 2 (Two) Times a Day for 10  days.    Influenza A  -     POCT Influenza A/B  -     oseltamivir (TAMIFLU) 75 MG capsule; Take 1 capsule by mouth 2 (Two) Times a Day for 5 days.        Results for orders placed or performed in visit on 02/16/18   POCT Influenza A/B   Result Value Ref Range    Rapid Influenza A Ag positive     Rapid Influenza B Ag negative     Internal Control Passed Passed    Lot Number [id number]     Expiration Date 2/19    POCT rapid strep A   Result Value Ref Range    Rapid Strep A Screen Positive (A) Negative, VALID, INVALID, Not Performed    Internal Control Passed Passed    Lot Number [id number]     Expiration Date 05-31-19

## 2018-02-16 NOTE — PATIENT INSTRUCTIONS
Strep Throat  Strep throat is a bacterial infection of the throat. Your health care provider may call the infection tonsillitis or pharyngitis, depending on whether there is swelling in the tonsils or at the back of the throat. Strep throat is most common during the cold months of the year in children who are 5-15 years of age, but it can happen during any season in people of any age. This infection is spread from person to person (contagious) through coughing, sneezing, or close contact.  What are the causes?  Strep throat is caused by the bacteria called Streptococcus pyogenes.  What increases the risk?  This condition is more likely to develop in:  · People who spend time in crowded places where the infection can spread easily.  · People who have close contact with someone who has strep throat.  What are the signs or symptoms?  Symptoms of this condition include:  · Fever or chills.  · Redness, swelling, or pain in the tonsils or throat.  · Pain or difficulty when swallowing.  · White or yellow spots on the tonsils or throat.  · Swollen, tender glands in the neck or under the jaw.  · Red rash all over the body (rare).  How is this diagnosed?  This condition is diagnosed by performing a rapid strep test or by taking a swab of your throat (throat culture test). Results from a rapid strep test are usually ready in a few minutes, but throat culture test results are available after one or two days.  How is this treated?  This condition is treated with antibiotic medicine.  Follow these instructions at home:  Medicines   · Take over-the-counter and prescription medicines only as told by your health care provider.  · Take your antibiotic as told by your health care provider. Do not stop taking the antibiotic even if you start to feel better.  · Have family members who also have a sore throat or fever tested for strep throat. They may need antibiotics if they have the strep infection.  Eating and drinking   · Do not  share food, drinking cups, or personal items that could cause the infection to spread to other people.  · If swallowing is difficult, try eating soft foods until your sore throat feels better.  · Drink enough fluid to keep your urine clear or pale yellow.  General instructions   · Gargle with a salt-water mixture 3-4 times per day or as needed. To make a salt-water mixture, completely dissolve ½-1 tsp of salt in 1 cup of warm water.  · Make sure that all household members wash their hands well.  · Get plenty of rest.  · Stay home from school or work until you have been taking antibiotics for 24 hours.  · Keep all follow-up visits as told by your health care provider. This is important.  Contact a health care provider if:  · The glands in your neck continue to get bigger.  · You develop a rash, cough, or earache.  · You cough up a thick liquid that is green, yellow-brown, or bloody.  · You have pain or discomfort that does not get better with medicine.  · Your problems seem to be getting worse rather than better.  · You have a fever.  Get help right away if:  · You have new symptoms, such as vomiting, severe headache, stiff or painful neck, chest pain, or shortness of breath.  · You have severe throat pain, drooling, or changes in your voice.  · You have swelling of the neck, or the skin on the neck becomes red and tender.  · You have signs of dehydration, such as fatigue, dry mouth, and decreased urination.  · You become increasingly sleepy, or you cannot wake up completely.  · Your joints become red or painful.  This information is not intended to replace advice given to you by your health care provider. Make sure you discuss any questions you have with your health care provider.  Document Released: 12/15/2001 Document Revised: 08/16/2017 Document Reviewed: 04/11/2016  [employer] Interactive Patient Education © 2017 [employer]    Influenza, Adult  Influenza, more commonly known as “the flu,” is a viral infection  that primarily affects the respiratory tract. The respiratory tract includes organs that help you breathe, such as the lungs, nose, and throat. The flu causes many common cold symptoms, as well as a high fever and body aches.  The flu spreads easily from person to person (is contagious). Getting a flu shot (influenza vaccination) every year is the best way to prevent influenza.  What are the causes?  Influenza is caused by a virus. You can catch the virus by:  · Breathing in droplets from an infected person's cough or sneeze.  · Touching something that was recently contaminated with the virus and then touching your mouth, nose, or eyes.  What increases the risk?  The following factors may make you more likely to get the flu:  · Not cleaning your hands frequently with soap and water or alcohol-based hand .  · Having close contact with many people during cold and flu season.  · Touching your mouth, eyes, or nose without washing or sanitizing your hands first.  · Not drinking enough fluids or not eating a healthy diet.  · Not getting enough sleep or exercise.  · Being under a high amount of stress.  · Not getting a yearly (annual) flu shot.  You may be at a higher risk of complications from the flu, such as a severe lung infection (pneumonia), if you:  · Are over the age of 65.  · Are pregnant.  · Have a weakened disease-fighting system (immune system). You may have a weakened immune system if you:  ¨ Have HIV or AIDS.  ¨ Are undergoing chemotherapy.  ¨ Are taking medicines that reduce the activity of (suppress) the immune system.  · Have a long-term (chronic) illness, such as heart disease, kidney disease, diabetes, or lung disease.  · Have a liver disorder.  · Are obese.  · Have anemia.  What are the signs or symptoms?  Symptoms of this condition typically last 4-10 days and may include:  · Fever.  · Chills.  · Headache, body aches, or muscle aches.  · Sore throat.  · Cough.  · Runny or congested  nose.  · Chest discomfort and cough.  · Poor appetite.  · Weakness or tiredness (fatigue).  · Dizziness.  · Nausea or vomiting.  How is this diagnosed?  This condition may be diagnosed based on your medical history and a physical exam. Your health care provider may do a nose or throat swab test to confirm the diagnosis.  How is this treated?  If influenza is detected early, you can be treated with antiviral medicine that can reduce the length of your illness and the severity of your symptoms. This medicine may be given by mouth (orally) or through an IV tube that is inserted in one of your veins.  The goal of treatment is to relieve symptoms by taking care of yourself at home. This may include taking over-the-counter medicines, drinking plenty of fluids, and adding humidity to the air in your home.  In some cases, influenza goes away on its own. Severe influenza or complications from influenza may be treated in a hospital.  Follow these instructions at home:  · Take over-the-counter and prescription medicines only as told by your health care provider.  · Use a cool mist humidifier to add humidity to the air in your home. This can make breathing easier.  · Rest as needed.  · Drink enough fluid to keep your urine clear or pale yellow.  · Cover your mouth and nose when you cough or sneeze.  · Wash your hands with soap and water often, especially after you cough or sneeze. If soap and water are not available, use hand .  · Stay home from work or school as told by your health care provider. Unless you are visiting your health care provider, try to avoid leaving home until your fever has been gone for 24 hours without the use of medicine.  · Keep all follow-up visits as told by your health care provider. This is important.  How is this prevented?  · Getting an annual flu shot is the best way to avoid getting the flu. You may get the flu shot in late summer, fall, or winter. Ask your health care provider when you  should get your flu shot.  · Wash your hands often or use hand  often.  · Avoid contact with people who are sick during cold and flu season.  · Eat a healthy diet, drink plenty of fluids, get enough sleep, and exercise regularly.  Contact a health care provider if:  · You develop new symptoms.  · You have:  ¨ Chest pain.  ¨ Diarrhea.  ¨ A fever.  · Your cough gets worse.  · You produce more mucus.  · You feel nauseous or you vomit.  Get help right away if:  · You develop shortness of breath or difficulty breathing.  · Your skin or nails turn a bluish color.  · You have severe pain or stiffness in your neck.  · You develop a sudden headache or sudden pain in your face or ear.  · You cannot stop vomiting.  This information is not intended to replace advice given to you by your health care provider. Make sure you discuss any questions you have with your health care provider.  Document Released: 12/15/2001 Document Revised: 05/25/2017 Document Reviewed: 10/11/2016  [employer] Interactive Patient Education © 2017 [employer]

## 2018-03-09 ENCOUNTER — OFFICE VISIT (OUTPATIENT)
Dept: CARDIOLOGY | Facility: CLINIC | Age: 38
End: 2018-03-09

## 2018-03-09 VITALS
WEIGHT: 127.2 LBS | DIASTOLIC BLOOD PRESSURE: 78 MMHG | BODY MASS INDEX: 21.72 KG/M2 | HEIGHT: 64 IN | SYSTOLIC BLOOD PRESSURE: 108 MMHG | HEART RATE: 80 BPM

## 2018-03-09 DIAGNOSIS — R07.89 ATYPICAL CHEST PAIN: ICD-10-CM

## 2018-03-09 DIAGNOSIS — R00.2 PALPITATIONS: Primary | ICD-10-CM

## 2018-03-09 PROCEDURE — 99213 OFFICE O/P EST LOW 20 MIN: CPT | Performed by: NURSE PRACTITIONER

## 2018-03-09 NOTE — PROGRESS NOTES
Encounter Date:03/09/2018    Patient ID: Michelle Cabrales is a 37 y.o. female who is a  that resides in [place], [place]    CC/Reason for visit:  Irregular Heart Beat (4 week follow up)            Michelle Cabrales returns today for follow-up of her palpitations and atypical chest pain and did discuss the results of her two-week monitor.  The patient was seen and new consultation last month at the request of her primary care provider for evaluation of palpitations and fatigue.  Patient has been experiencing chronic fatigue and comes for over a year.  She had been feeling her heart race which would cause her to feel very anxious as if she could be dying.  The patient had already underwent an echocardiogram and myocardial perfusion study that were within normal limits.  Her 2 week event monitor shows predominantly normal sinus rhythm with rare PACs and PVCs.  The patient has been taking Zoloft which is helping some.  She feels relieved that her monitor was essentially benign.  She did experience the same symptoms while she was wearing the monitor.  She admits to drinking caffeinated drinks and had even taken diet pills at one time.  She has been wanting to start exercising to see if it makes her feel better but she had been scared to for fear of heart problems.  She has been receiving vitamin B12 injections from her primary care provider which has helped her fatigue some.    Review of Systems   Constitution: Positive for malaise/fatigue.   Cardiovascular: Positive for irregular heartbeat and palpitations.   Respiratory: Positive for cough.    Musculoskeletal: Positive for muscle cramps.   Neurological: Positive for headaches.       The patient's past medical, social, family history and ROS reviewed in the patient's electronic medical record.    Allergies  Review of patient's allergies indicates no known allergies.    Outpatient Prescriptions Marked as Taking for the 3/9/18 encounter  "(Office Visit) with Ronn Kaiser IV, MD   Medication Sig Dispense Refill   • Bioflavonoid Products (VITAMIN C PLUS PO) Take  by mouth Daily.     • Cholecalciferol (VITAMIN D3) 5000 units capsule capsule Take 5,000 Units by mouth Daily.     • COCONUT OIL PO Take  by mouth Daily.     • NAPROXEN PO Take 500 mg by mouth As Needed.     • Prenatal Vit-Fe Fumarate-FA (PRENATAL VITAMIN PO) Take  by mouth Daily.     • Sertraline HCl (ZOLOFT PO) Take 25 mg by mouth Daily.           Blood pressure 108/78, pulse 80, height 162.6 cm (64\"), weight 57.7 kg (127 lb 3.2 oz).  Body mass index is 21.83 kg/(m^2).    Physical Exam   Constitutional: She is oriented to person, place, and time. She appears well-developed and well-nourished.   HENT:   Head: Normocephalic and atraumatic.   Eyes: Pupils are equal, round, and reactive to light. No scleral icterus.   Neck: No JVD present. Carotid bruit is not present. No thyromegaly present.   Cardiovascular: Normal rate, regular rhythm, S1 normal and S2 normal.  Exam reveals no gallop.    No murmur heard.  Pulmonary/Chest: Effort normal and breath sounds normal.   Abdominal: Soft. There is no hepatosplenomegaly. There is no tenderness.   Neurological: She is alert and oriented to person, place, and time.   Skin: Skin is warm and dry. No cyanosis. Nails show no clubbing.   Psychiatric: She has a normal mood and affect. Her behavior is normal.       Data Review:   Procedures       Problem List Items Addressed This Visit        Cardiovascular and Mediastinum    Palpitations - Primary    Overview     · Echo (6/6/17): Normal LVEF. No valvular abnormality.  · 13 day Holter (2/2018): Sinus rhythm with average heart rate 79 bpm (47-1 64 bpm). No sustained arrhythmia. Essentially normal Holter.         Current Assessment & Plan     · No further treatment needed.  · Patient can follow-up with us on an as-needed basis            Nervous and Auditory    Atypical chest pain    Overview     " · [facility] ER presentation with chest pain and palpitations, 6/2017  · Exercise nuclear stress (6/6/17): Exercise for 10.5 minutes. Normal perfusion and EKG. LVEF >70%  · Echo (6/6/17): LVEF normal. No valvular abnormality.  · Repeat chest pain and palpitation presentation to [facility] with normal EKG and negative troponins, 1/20/18         Current Assessment & Plan     · No further treatment needed.   · Follow up with PCP to look for non cardiac etiologies.             The patient's symptoms are clearly not cardiac related.  She had a normal echo, normal stress tests and normal 2 week monitor.  She should follow back up with her primary care provider to look for noncardiac etiologies to account for her chronic fatigue       · Follow-up with primary care provider to look for noncardiac etiologies for her chronic fatigue  · She can follow-up with us on an as-needed basis    Dolores Meza, YANCY  3/9/2018

## 2018-09-24 ENCOUNTER — HOSPITAL ENCOUNTER (EMERGENCY)
Facility: HOSPITAL | Age: 38
Discharge: HOME | End: 2018-09-24
Attending: EMERGENCY MEDICINE | Admitting: EMERGENCY MEDICINE

## 2018-09-24 ENCOUNTER — APPOINTMENT (OUTPATIENT)
Dept: ULTRASOUND IMAGING | Facility: HOSPITAL | Age: 38
End: 2018-09-24
Attending: EMERGENCY MEDICINE

## 2018-09-24 VITALS
SYSTOLIC BLOOD PRESSURE: 128 MMHG | HEART RATE: 88 BPM | DIASTOLIC BLOOD PRESSURE: 74 MMHG | WEIGHT: 136 LBS | HEIGHT: 64 IN | RESPIRATION RATE: 16 BRPM | OXYGEN SATURATION: 99 % | TEMPERATURE: 98.2 F | BODY MASS INDEX: 23.22 KG/M2

## 2018-09-24 DIAGNOSIS — S86.911A MUSCLE STRAIN OF RIGHT LOWER LEG, INITIAL ENCOUNTER: Primary | ICD-10-CM

## 2018-09-24 LAB
ALBUMIN SERPL-MCNC: 4.1 G/DL (ref 3.5–5)
ALBUMIN/GLOB SERPL: 1.5 G/DL (ref 1.5–2.5)
ALP SERPL-CCNC: 66 U/L (ref 35–104)
ALT SERPL W P-5'-P-CCNC: 17 U/L (ref 10–36)
ANION GAP SERPL CALCULATED.3IONS-SCNC: 7.5 MMOL/L (ref 3.6–11.2)
AST SERPL-CCNC: 18 U/L (ref 10–30)
BASOPHILS # BLD AUTO: 0.05 10*3/MM3 (ref 0–0.3)
BASOPHILS NFR BLD AUTO: 0.8 % (ref 0–2)
BILIRUB SERPL-MCNC: 0.7 MG/DL (ref 0.2–1.8)
BUN BLD-MCNC: 14 MG/DL (ref 7–21)
BUN/CREAT SERPL: 19.2 (ref 7–25)
CALCIUM SPEC-SCNC: 8.9 MG/DL (ref 7.7–10)
CHLORIDE SERPL-SCNC: 107 MMOL/L (ref 99–112)
CO2 SERPL-SCNC: 27.5 MMOL/L (ref 24.3–31.9)
CREAT BLD-MCNC: 0.73 MG/DL (ref 0.43–1.29)
DEPRECATED RDW RBC AUTO: 46.8 FL (ref 37–54)
EOSINOPHIL # BLD AUTO: 0.03 10*3/MM3 (ref 0–0.7)
EOSINOPHIL NFR BLD AUTO: 0.5 % (ref 0–5)
ERYTHROCYTE [DISTWIDTH] IN BLOOD BY AUTOMATED COUNT: 14 % (ref 11.5–14.5)
GFR SERPL CREATININE-BSD FRML MDRD: 89 ML/MIN/1.73
GLOBULIN UR ELPH-MCNC: 2.8 GM/DL
GLUCOSE BLD-MCNC: 96 MG/DL (ref 70–110)
HCG SERPL QL: NEGATIVE
HCT VFR BLD AUTO: 43.1 % (ref 37–47)
HGB BLD-MCNC: 14.4 G/DL (ref 12–16)
HOLD SPECIMEN: NORMAL
HOLD SPECIMEN: NORMAL
IMM GRANULOCYTES # BLD: 0.02 10*3/MM3 (ref 0–0.03)
IMM GRANULOCYTES NFR BLD: 0.3 % (ref 0–0.5)
LYMPHOCYTES # BLD AUTO: 1.34 10*3/MM3 (ref 1–3)
LYMPHOCYTES NFR BLD AUTO: 22.1 % (ref 21–51)
MCH RBC QN AUTO: 30.4 PG (ref 27–33)
MCHC RBC AUTO-ENTMCNC: 33.4 G/DL (ref 33–37)
MCV RBC AUTO: 91.1 FL (ref 80–94)
MONOCYTES # BLD AUTO: 0.38 10*3/MM3 (ref 0.1–0.9)
MONOCYTES NFR BLD AUTO: 6.3 % (ref 0–10)
NEUTROPHILS # BLD AUTO: 4.23 10*3/MM3 (ref 1.4–6.5)
NEUTROPHILS NFR BLD AUTO: 70 % (ref 30–70)
OSMOLALITY SERPL CALC.SUM OF ELEC: 283.5 MOSM/KG (ref 273–305)
PLATELET # BLD AUTO: 226 10*3/MM3 (ref 130–400)
PMV BLD AUTO: 11.6 FL (ref 6–10)
POTASSIUM BLD-SCNC: 4.1 MMOL/L (ref 3.5–5.3)
PROT SERPL-MCNC: 6.9 G/DL (ref 6–8)
RBC # BLD AUTO: 4.73 10*6/MM3 (ref 4.2–5.4)
SODIUM BLD-SCNC: 142 MMOL/L (ref 135–153)
WBC NRBC COR # BLD: 6.05 10*3/MM3 (ref 4.5–12.5)
WHOLE BLOOD HOLD SPECIMEN: NORMAL
WHOLE BLOOD HOLD SPECIMEN: NORMAL

## 2018-09-24 PROCEDURE — 99284 EMERGENCY DEPT VISIT MOD MDM: CPT

## 2018-09-24 PROCEDURE — 85025 COMPLETE CBC W/AUTO DIFF WBC: CPT | Performed by: EMERGENCY MEDICINE

## 2018-09-24 PROCEDURE — 36415 COLL VENOUS BLD VENIPUNCTURE: CPT

## 2018-09-24 PROCEDURE — 80053 COMPREHEN METABOLIC PANEL: CPT | Performed by: EMERGENCY MEDICINE

## 2018-09-24 PROCEDURE — 93971 EXTREMITY STUDY: CPT

## 2018-09-24 PROCEDURE — 93971 EXTREMITY STUDY: CPT | Performed by: RADIOLOGY

## 2018-09-24 PROCEDURE — 84703 CHORIONIC GONADOTROPIN ASSAY: CPT | Performed by: EMERGENCY MEDICINE

## 2018-09-24 RX ORDER — CYCLOBENZAPRINE HCL 10 MG
10 TABLET ORAL 2 TIMES DAILY PRN
Qty: 20 TABLET | Refills: 0 | Status: SHIPPED | OUTPATIENT
Start: 2018-09-24 | End: 2022-05-09

## 2018-09-24 RX ORDER — CYCLOBENZAPRINE HCL 10 MG
10 TABLET ORAL ONCE
Status: COMPLETED | OUTPATIENT
Start: 2018-09-24 | End: 2018-09-24

## 2018-09-24 RX ADMIN — CYCLOBENZAPRINE HYDROCHLORIDE 10 MG: 10 TABLET, FILM COATED ORAL at 12:34

## 2018-09-24 RX ADMIN — IBUPROFEN 600 MG: 400 TABLET, FILM COATED ORAL at 12:32

## 2018-09-24 NOTE — ED PROVIDER NOTES
Subjective     History provided by:  Patient  Lower Extremity Issue   Location:  Leg  Time since incident:  1 day  Injury: no    Leg location:  R leg  Pain details:     Quality:  Aching and cramping    Radiates to:  Does not radiate    Severity:  Mild    Onset quality:  Sudden    Timing:  Constant    Progression:  Worsening  Prior injury to area:  Yes  Relieved by:  Nothing  Associated symptoms: swelling and tingling    Associated symptoms: no fever        Review of Systems   Constitutional: Negative.  Negative for fever.   HENT: Negative.    Respiratory: Negative.    Cardiovascular: Negative.  Negative for chest pain.   Gastrointestinal: Negative.  Negative for abdominal pain.   Endocrine: Negative.    Genitourinary: Negative.  Negative for dysuria.   Musculoskeletal: Positive for myalgias.   Skin: Negative.    Neurological: Negative.    Psychiatric/Behavioral: Negative.    All other systems reviewed and are negative.      Past Medical History:   Diagnosis Date   • Acid reflux    • Allergic    • Anxiety    • Arthritis    • Bronchitis    • Heart murmur     diagnosed @ 13 years of age   • Hypertension    • Kidney disease     right side    • Kidney stone    • Migraines    • Strep pharyngitis    • Urinary tract infection        No Known Allergies    Past Surgical History:   Procedure Laterality Date   • CYSTOSCOPY W/ LASER LITHOTRIPSY  2017    has had 4 procedures, 1st procedure was Lithotrypsy, last 3 were scope with retrieval    • KIDNEY SURGERY  2015    right side    • VAGINAL DELIVERY  04/2009    female       Family History   Problem Relation Age of Onset   • Heart disease Mother    • Hypertension Mother    • Cancer Mother    • Melanoma Mother    • Diabetes Mother    • Diabetes type II Mother    • Heart disease Father    • Kidney disease Father    • Cancer Father    • Melanoma Father    • Cancer Maternal Grandmother    • Thyroid cancer Maternal Grandmother    • Diabetes Maternal Grandmother    • Diabetes type I  Maternal Grandmother        Social History     Social History   • Marital status:      Social History Main Topics   • Smoking status: Never Smoker   • Smokeless tobacco: Never Used   • Alcohol use No   • Drug use: No   • Sexual activity: Defer      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 She appears well-developed and well-nourished. No distress.   HENT:   Head: Normocephalic and atraumatic.   Right Ear: External ear normal.   Left Ear: External ear normal.   Nose: Nose normal.   Eyes: Conjunctivae are normal.   Neck: Normal range of motion. Neck supple. No JVD present. No tracheal deviation present.   Cardiovascular: Normal rate.    No murmur heard.  Pulmonary/Chest: Effort normal. No respiratory distress. She has no wheezes.   Abdominal: Soft. There is no tenderness.   Musculoskeletal: Normal range of motion. She exhibits edema and tenderness. She exhibits no deformity.   Medial right lower leg tenderness along the medial calf muscle.    Neurological: She is alert and oriented to person, place, and time. No cranial nerve deficit.   Skin: Skin is warm and dry. No rash noted. She is not diaphoretic. No erythema. No pallor.   Psychiatric: She has a normal mood and affect. Her behavior is normal. Thought content normal.   Nursing note and vitals reviewed.      Procedures           ED Course  ED Course as of Sep 24 1229   Mon Sep 24, 2018   1212 Venous Doppler rad reviewed:  No sonographic findings of DVT in the right lower extremity  [RB]      ED Course User Index  [RB] Cordell Shukla PA                  MDM  Number of Diagnoses or Management Options  Muscle strain of right lower leg, initial encounter: new and requires workup     Amount and/or Complexity of Data Reviewed  Clinical lab tests: reviewed  Tests in the radiology section of CPT®: reviewed    Risk of  Complications, Morbidity, and/or Mortality  Presenting problems: moderate  Diagnostic procedures: moderate  Management options: low    Patient Progress  Patient progress: stable        Final diagnoses:   Muscle strain of right lower leg, initial encounter            Cordell Shukla PA  09/24/18 [id number]

## 2022-05-09 ENCOUNTER — OFFICE VISIT (OUTPATIENT)
Dept: CARDIOLOGY | Facility: CLINIC | Age: 42
End: 2022-05-09

## 2022-05-09 ENCOUNTER — LAB (OUTPATIENT)
Dept: LAB | Facility: HOSPITAL | Age: 42
End: 2022-05-09

## 2022-05-09 VITALS
HEART RATE: 70 BPM | BODY MASS INDEX: 26.43 KG/M2 | DIASTOLIC BLOOD PRESSURE: 81 MMHG | WEIGHT: 154.8 LBS | HEIGHT: 64 IN | OXYGEN SATURATION: 99 % | SYSTOLIC BLOOD PRESSURE: 122 MMHG

## 2022-05-09 DIAGNOSIS — R00.2 PALPITATIONS: ICD-10-CM

## 2022-05-09 DIAGNOSIS — R07.89 ATYPICAL CHEST PAIN: ICD-10-CM

## 2022-05-09 DIAGNOSIS — I49.3 PVC (PREMATURE VENTRICULAR CONTRACTION): ICD-10-CM

## 2022-05-09 DIAGNOSIS — F41.9 ANXIETY: Primary | ICD-10-CM

## 2022-05-09 DIAGNOSIS — F41.9 ANXIETY: ICD-10-CM

## 2022-05-09 LAB
ALBUMIN SERPL-MCNC: 4.24 G/DL (ref 3.5–5.2)
ALBUMIN/GLOB SERPL: 1.8 G/DL
ALP SERPL-CCNC: 93 U/L (ref 39–117)
ALT SERPL W P-5'-P-CCNC: 12 U/L (ref 1–33)
ANION GAP SERPL CALCULATED.3IONS-SCNC: 12.2 MMOL/L (ref 5–15)
AST SERPL-CCNC: 13 U/L (ref 1–32)
BASOPHILS # BLD AUTO: 0.09 10*3/MM3 (ref 0–0.2)
BASOPHILS NFR BLD AUTO: 1.2 % (ref 0–1.5)
BILIRUB SERPL-MCNC: 0.7 MG/DL (ref 0–1.2)
BUN SERPL-MCNC: 11 MG/DL (ref 6–20)
BUN/CREAT SERPL: 13.8 (ref 7–25)
CALCIUM SPEC-SCNC: 9.2 MG/DL (ref 8.6–10.5)
CHLORIDE SERPL-SCNC: 105 MMOL/L (ref 98–107)
CO2 SERPL-SCNC: 23.8 MMOL/L (ref 22–29)
CREAT SERPL-MCNC: 0.8 MG/DL (ref 0.57–1)
DEPRECATED RDW RBC AUTO: 43.9 FL (ref 37–54)
EGFRCR SERPLBLD CKD-EPI 2021: 94.5 ML/MIN/1.73
EOSINOPHIL # BLD AUTO: 0.29 10*3/MM3 (ref 0–0.4)
EOSINOPHIL NFR BLD AUTO: 3.9 % (ref 0.3–6.2)
ERYTHROCYTE [DISTWIDTH] IN BLOOD BY AUTOMATED COUNT: 13.3 % (ref 12.3–15.4)
GLOBULIN UR ELPH-MCNC: 2.4 GM/DL
GLUCOSE SERPL-MCNC: 91 MG/DL (ref 65–99)
HCT VFR BLD AUTO: 42.7 % (ref 34–46.6)
HGB BLD-MCNC: 14 G/DL (ref 12–15.9)
IMM GRANULOCYTES # BLD AUTO: 0.04 10*3/MM3 (ref 0–0.05)
IMM GRANULOCYTES NFR BLD AUTO: 0.5 % (ref 0–0.5)
LYMPHOCYTES # BLD AUTO: 2.54 10*3/MM3 (ref 0.7–3.1)
LYMPHOCYTES NFR BLD AUTO: 34 % (ref 19.6–45.3)
MAGNESIUM SERPL-MCNC: 2 MG/DL (ref 1.6–2.6)
MCH RBC QN AUTO: 29.4 PG (ref 26.6–33)
MCHC RBC AUTO-ENTMCNC: 32.8 G/DL (ref 31.5–35.7)
MCV RBC AUTO: 89.5 FL (ref 79–97)
MONOCYTES # BLD AUTO: 0.55 10*3/MM3 (ref 0.1–0.9)
MONOCYTES NFR BLD AUTO: 7.4 % (ref 5–12)
NEUTROPHILS NFR BLD AUTO: 3.95 10*3/MM3 (ref 1.7–7)
NEUTROPHILS NFR BLD AUTO: 53 % (ref 42.7–76)
NRBC BLD AUTO-RTO: 0 /100 WBC (ref 0–0.2)
PLATELET # BLD AUTO: 230 10*3/MM3 (ref 140–450)
PMV BLD AUTO: 11.3 FL (ref 6–12)
POTASSIUM SERPL-SCNC: 4.3 MMOL/L (ref 3.5–5.2)
PROT SERPL-MCNC: 6.6 G/DL (ref 6–8.5)
RBC # BLD AUTO: 4.77 10*6/MM3 (ref 3.77–5.28)
SODIUM SERPL-SCNC: 141 MMOL/L (ref 136–145)
TSH SERPL DL<=0.05 MIU/L-ACNC: 3.39 UIU/ML (ref 0.27–4.2)
WBC NRBC COR # BLD: 7.46 10*3/MM3 (ref 3.4–10.8)

## 2022-05-09 PROCEDURE — 36415 COLL VENOUS BLD VENIPUNCTURE: CPT

## 2022-05-09 PROCEDURE — 83735 ASSAY OF MAGNESIUM: CPT

## 2022-05-09 PROCEDURE — 80050 GENERAL HEALTH PANEL: CPT

## 2022-05-09 PROCEDURE — 99204 OFFICE O/P NEW MOD 45 MIN: CPT | Performed by: NURSE PRACTITIONER

## 2022-05-09 RX ORDER — BUPROPION HYDROCHLORIDE 100 MG/1
100 TABLET ORAL 2 TIMES DAILY
COMMUNITY
End: 2022-05-09 | Stop reason: SDUPTHER

## 2022-05-09 RX ORDER — METOPROLOL SUCCINATE 25 MG/1
25 TABLET, EXTENDED RELEASE ORAL DAILY
COMMUNITY

## 2022-05-09 RX ORDER — ESCITALOPRAM OXALATE 10 MG/1
10 TABLET ORAL DAILY
Qty: 30 TABLET | Refills: 0 | Status: SHIPPED | OUTPATIENT
Start: 2022-05-09 | End: 2022-07-13

## 2022-05-09 NOTE — PROGRESS NOTES
Subjective     Michelle Cabrales is a 41 y.o. female who presents to day for Chest Pain (Chest pressure), Palpitations, and presyncope.    CHIEF COMPLIANT  Chief Complaint   Patient presents with   • Chest Pain     Chest pressure   • Palpitations   • presyncope       Active Problems:  Problem List Items Addressed This Visit        Cardiac and Vasculature    Palpitations    Overview     · Echo (6/6/17): Normal LVEF. No valvular abnormality.  · 13 day Holter (2/2018): Sinus rhythm with average heart rate 79 bpm (47-1 64 bpm). No sustained arrhythmia. Essentially normal Holter.           Relevant Orders    Adult Transthoracic Echo Complete W/ Cont if Necessary Per Protocol    Cardiac Event Monitor    Treadmill Stress Test    Comprehensive Metabolic Panel (Completed)    CBC & Differential (Completed)    TSH (Completed)    Magnesium (Completed)       Symptoms and Signs    Atypical chest pain    Overview     · [facility] ER presentation with chest pain and palpitations, 6/2017  · Exercise nuclear stress (6/6/17): Exercise for 10.5 minutes. Normal perfusion and EKG. LVEF >70%  · Echo (6/6/17): LVEF normal. No valvular abnormality.  · Repeat chest pain and palpitation presentation to [facility] with normal EKG and negative troponins, 1/20/18           Relevant Orders    Adult Transthoracic Echo Complete W/ Cont if Necessary Per Protocol    Cardiac Event Monitor    Treadmill Stress Test    Comprehensive Metabolic Panel (Completed)    CBC & Differential (Completed)    TSH (Completed)    Magnesium (Completed)      Other Visit Diagnoses     Anxiety    -  Primary    Relevant Medications    escitalopram (Lexapro) 10 MG tablet    Other Relevant Orders    Adult Transthoracic Echo Complete W/ Cont if Necessary Per Protocol    Cardiac Event Monitor    Treadmill Stress Test    Comprehensive Metabolic Panel (Completed)    CBC & Differential (Completed)    TSH (Completed)    Magnesium (Completed)    PVC (premature ventricular  contraction)        Relevant Medications    metoprolol succinate XL (TOPROL-XL) 25 MG 24 hr tablet    Other Relevant Orders    Adult Transthoracic Echo Complete W/ Cont if Necessary Per Protocol    Cardiac Event Monitor    Treadmill Stress Test    Comprehensive Metabolic Panel (Completed)    CBC & Differential (Completed)    TSH (Completed)    Magnesium (Completed)      Problem list  1 chest pain  2.  Shortness of breath  3.  Palpitations  4.  Syncope    HPI  HPI  Ms. Cabrales is a 41-year-old female patient who is being seen today for chest pain, shortness of breath, palpitations, and syncope patient does report intermittent midsternal chest pain which she describes as a discomfort or pressure.  Mainly occurs with rest when she lays down and is not impacted with activity.  She does have associated cough like sensation when it occurs.  She reports that both her mother and father had coronary artery disease in their 50s.  She also reports intermittent shortness of breath that usually occurs with activity but can occur at rest.  She also has palpitations where they a occur quite frequently and have a bunched together.  She says recently she has had 2 episodes where it is Going and lasting around 5 minutes then would stop and recur.  She says that this feels like a fluttering type sensation in which she further describes as feeling a baby kick in her chest.  She does have associated cough.  She says that when she does have the palpitations it feels like she has to cough.  Her palpitations do interfere with her activities of daily living.  She is also had associated presyncope/syncope where she feels like she is going to pass out.  She says these feelings last about 20 minutes and occur at random and not necessarily associated with chest pain or shortness of breath.  She has been on Lexapro for 2 to 3 days and just recently was switched to Wellbutrin.  This is the only medication change in which she reports.  She  denies any lower extremity edema, PND, orthopnea, or strokelike symptoms.  PRIOR MEDS  Current Outpatient Medications on File Prior to Visit   Medication Sig Dispense Refill   • metoprolol succinate XL (TOPROL-XL) 25 MG 24 hr tablet Take 25 mg by mouth Daily. 1/2 tab po qd       No current facility-administered medications on file prior to visit.       ALLERGIES  Patient has no known allergies.    HISTORY  Past Medical History:   Diagnosis Date   • Acid reflux    • Allergic    • Anxiety    • Arthritis    • Bronchitis    • Heart murmur     diagnosed @ 13 years of age   • Hypertension    • Kidney disease     right side    • Kidney stone    • Migraines    • Strep pharyngitis    • Urinary tract infection        Social History     Socioeconomic History   • Marital status:    Tobacco Use   • Smoking status: Never Smoker   • Smokeless tobacco: Never Used   Substance and Sexual Activity   • Alcohol use: No   • Drug use: Never   • Sexual activity: Defer     Birth control/protection: I.U.D.     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 Negative for chills and fever.   HENT: Negative for congestion, rhinorrhea and sore throat.    Respiratory: Positive for cough, chest tightness (feels it the most at rest at night whenshe lays down) and shortness of breath.    Cardiovascular: Positive for chest pain (discomfort ) and palpitations. Negative for leg swelling.   Gastrointestinal: Positive for nausea. Negative for  "constipation and diarrhea.   Musculoskeletal: Positive for arthralgias. Negative for back pain and neck pain.   Allergic/Immunologic: Negative for environmental allergies and food allergies.   Neurological: Positive for dizziness, syncope (pre syncope), weakness and light-headedness.   Psychiatric/Behavioral: Negative for sleep disturbance.       Objective     VITALS: /81 (BP Location: Left arm, Patient Position: Sitting)   Pulse 70   Ht 162.6 cm (64\")   Wt 70.2 kg (154 lb 12.8 oz)   SpO2 99%   BMI 26.57 kg/m²     LABS:   Lab Results (most recent)     None          IMAGING:   No Images in the past 120 days found..    EXAM:  Physical Exam  Vitals and nursing note reviewed.   Constitutional:       Appearance: She is well-developed.   HENT:      Head: Normocephalic and atraumatic.   Eyes:      Pupils: Pupils are equal, round, and reactive to light.   Neck:      Thyroid: No thyroid mass.      Vascular: No carotid bruit or JVD.      Trachea: Trachea and phonation normal.   Cardiovascular:      Rate and Rhythm: Normal rate and regular rhythm.      Pulses:           Radial pulses are 2+ on the right side and 2+ on the left side.        Posterior tibial pulses are 2+ on the right side and 2+ on the left side.      Heart sounds: Normal heart sounds. No murmur heard.    No friction rub. No gallop.   Pulmonary:      Effort: Pulmonary effort is normal. No tachypnea, accessory muscle usage or respiratory distress.      Breath sounds: Normal breath sounds. No wheezing or rales.   Abdominal:      General: Bowel sounds are normal.      Palpations: Abdomen is soft.   Musculoskeletal:         General: Normal range of motion.      Cervical back: Normal range of motion and neck supple.   Skin:     General: Skin is warm and dry.      Capillary Refill: Capillary refill takes less than 2 seconds.      Findings: No rash.   Neurological:      Mental Status: She is alert and oriented to person, place, and time.   Psychiatric:    "      Mood and Affect: Mood normal.         Speech: Speech normal.         Behavior: Behavior normal.         Thought Content: Thought content normal.         Judgment: Judgment normal.         Procedure   Procedures       Assessment & Plan    Diagnosis Plan   1. Anxiety  escitalopram (Lexapro) 10 MG tablet    Adult Transthoracic Echo Complete W/ Cont if Necessary Per Protocol    Cardiac Event Monitor    Treadmill Stress Test    Comprehensive Metabolic Panel    CBC & Differential    TSH    Magnesium   2. Palpitations  Adult Transthoracic Echo Complete W/ Cont if Necessary Per Protocol    Cardiac Event Monitor    Treadmill Stress Test    Comprehensive Metabolic Panel    CBC & Differential    TSH    Magnesium   3. Atypical chest pain  Adult Transthoracic Echo Complete W/ Cont if Necessary Per Protocol    Cardiac Event Monitor    Treadmill Stress Test    Comprehensive Metabolic Panel    CBC & Differential    TSH    Magnesium   4. PVC (premature ventricular contraction)  Adult Transthoracic Echo Complete W/ Cont if Necessary Per Protocol    Cardiac Event Monitor    Treadmill Stress Test    Comprehensive Metabolic Panel    CBC & Differential    TSH    Magnesium   1.  I do feel like patient's presyncope and jittery feeling may be associated with abrupt stop of Lexapro.  She wishes to restart the Lexapro.  I will prescribe for short duration and she will follow-up with her PCP for further discussion and potential management of her anxiety.  2.  Due to patient's palpitations which is led to presyncope I would also like to evaluate with an extensive laboratory work-up including CBC, CMP, TSH, and magnesium.  In addition to this I like for her to wear a cardiac event monitor 14 days that determine the etiology of her palpitations.  3.  Due to her significant family history of heart diseas as well as her 2 complaints of chest pain, shortness of breath and presyncope I do think it is appropriate to do a ischemic work-up  including stress test and echocardiogram for further evaluation.  4.  Informed of signs and symptoms of ACS and advised to seek emergent treatment for any new worsening symptoms.  Patient also advised sooner follow-up as needed.  Also advised to follow-up with family doctor as needed  This note is dictated utilizing voice recognition software.  Although this record has been proof read, transcriptional errors may still be present. If questions occur regarding the content of this record please do not hesitate to call our office.  I have reviewed and confirmed the accuracy of the ROS as documented by the MA/LPN/RN YANCY Negrete  No follow-ups on file.    Diagnoses and all orders for this visit:    1. Anxiety (Primary)  -     escitalopram (Lexapro) 10 MG tablet; Take 1 tablet by mouth Daily.  Dispense: 30 tablet; Refill: 0  -     Adult Transthoracic Echo Complete W/ Cont if Necessary Per Protocol; Future  -     Cardiac Event Monitor; Future  -     Treadmill Stress Test; Future  -     Comprehensive Metabolic Panel; Future  -     CBC & Differential; Future  -     TSH; Future  -     Magnesium; Future    2. Palpitations  -     Adult Transthoracic Echo Complete W/ Cont if Necessary Per Protocol; Future  -     Cardiac Event Monitor; Future  -     Treadmill Stress Test; Future  -     Comprehensive Metabolic Panel; Future  -     CBC & Differential; Future  -     TSH; Future  -     Magnesium; Future    3. Atypical chest pain  -     Adult Transthoracic Echo Complete W/ Cont if Necessary Per Protocol; Future  -     Cardiac Event Monitor; Future  -     Treadmill Stress Test; Future  -     Comprehensive Metabolic Panel; Future  -     CBC & Differential; Future  -     TSH; Future  -     Magnesium; Future    4. PVC (premature ventricular contraction)  -     Adult Transthoracic Echo Complete W/ Cont if Necessary Per Protocol; Future  -     Cardiac Event Monitor; Future  -     Treadmill Stress Test; Future  -     Comprehensive  Metabolic Panel; Future  -     CBC & Differential; Future  -     TSH; Future  -     Magnesium; Future        Michelle Cabrales  reports that she has never smoked. She has never used smokeless tobacco.. I have educated her on the risk of diseases from using tobacco products .       MEDS ORDERED DURING VISIT:  New Medications Ordered This Visit   Medications   • escitalopram (Lexapro) 10 MG tablet     Sig: Take 1 tablet by mouth Daily.     Dispense:  30 tablet     Refill:  0           This document has been electronically signed by Bigg Alcaraz Jr., APRN  May 16, 2022 09:41 EDT

## 2022-05-10 ENCOUNTER — TELEPHONE (OUTPATIENT)
Dept: CARDIOLOGY | Facility: CLINIC | Age: 42
End: 2022-05-10

## 2022-05-10 NOTE — TELEPHONE ENCOUNTER
LABS  Pt notified of no acute findings. Provider will discuss results at f/u. Pt reminded of appt date and time.  ----- Message from Bela Dick MA sent at 5/10/2022 10:28 AM EDT -----    ----- Message -----  From: Bigg Alcaraz APRN  Sent: 5/10/2022   8:28 AM EDT  To: Bela Dick MA    Normal labs.  Keep follow-up.  ----- Message -----  From: Lab, Background User  Sent: 5/9/2022   6:55 PM EDT  To: YANCY Negrete

## 2022-06-30 ENCOUNTER — HOSPITAL ENCOUNTER (OUTPATIENT)
Dept: CARDIOLOGY | Facility: HOSPITAL | Age: 42
Discharge: HOME | End: 2022-06-30

## 2022-06-30 VITALS — WEIGHT: 154.76 LBS | HEIGHT: 64 IN | BODY MASS INDEX: 26.42 KG/M2

## 2022-06-30 DIAGNOSIS — R07.89 ATYPICAL CHEST PAIN: ICD-10-CM

## 2022-06-30 DIAGNOSIS — R00.2 PALPITATIONS: ICD-10-CM

## 2022-06-30 DIAGNOSIS — F41.9 ANXIETY: ICD-10-CM

## 2022-06-30 DIAGNOSIS — I49.3 PVC (PREMATURE VENTRICULAR CONTRACTION): ICD-10-CM

## 2022-06-30 LAB
BH CV STRESS RECOVERY BP: NORMAL MMHG
BH CV STRESS RECOVERY HR: 77 BPM
MAXIMAL PREDICTED HEART RATE: 178 BPM
PERCENT MAX PREDICTED HR: 79.21 %
STRESS BASELINE BP: NORMAL MMHG
STRESS BASELINE HR: 67 BPM
STRESS PERCENT HR: 93 %
STRESS POST ESTIMATED WORKLOAD: 13.4 METS
STRESS POST EXERCISE DUR MIN: 12 MIN
STRESS POST PEAK BP: NORMAL MMHG
STRESS POST PEAK HR: 141 BPM
STRESS TARGET HR: 151 BPM

## 2022-06-30 PROCEDURE — 93306 TTE W/DOPPLER COMPLETE: CPT | Performed by: INTERNAL MEDICINE

## 2022-06-30 PROCEDURE — 93306 TTE W/DOPPLER COMPLETE: CPT

## 2022-06-30 PROCEDURE — 93018 CV STRESS TEST I&R ONLY: CPT | Performed by: INTERNAL MEDICINE

## 2022-06-30 PROCEDURE — 93017 CV STRESS TEST TRACING ONLY: CPT

## 2022-07-03 LAB
AORTIC DIMENSIONLESS INDEX: 0.95 (DI)
BH CV ECHO MEAS - ACS: 1.61 CM
BH CV ECHO MEAS - AO MAX PG: 4.9 MMHG
BH CV ECHO MEAS - AO MEAN PG: 2.36 MMHG
BH CV ECHO MEAS - AO ROOT DIAM: 2.7 CM
BH CV ECHO MEAS - AO V2 MAX: 110.1 CM/SEC
BH CV ECHO MEAS - AO V2 VTI: 24.8 CM
BH CV ECHO MEAS - EDV(CUBED): 87.5 ML
BH CV ECHO MEAS - ESV(CUBED): 17.8 ML
BH CV ECHO MEAS - FS: 41.1 %
BH CV ECHO MEAS - IVS/LVPW: 0.97 CM
BH CV ECHO MEAS - IVSD: 0.93 CM
BH CV ECHO MEAS - LA DIMENSION: 3.2 CM
BH CV ECHO MEAS - LAT PEAK E' VEL: 8.9 CM/SEC
BH CV ECHO MEAS - LV MASS(C)D: 139.5 GRAMS
BH CV ECHO MEAS - LV MAX PG: 4.3 MMHG
BH CV ECHO MEAS - LV MEAN PG: 1.95 MMHG
BH CV ECHO MEAS - LV V1 MAX: 104 CM/SEC
BH CV ECHO MEAS - LV V1 VTI: 23.3 CM
BH CV ECHO MEAS - LVIDD: 4.4 CM
BH CV ECHO MEAS - LVIDS: 2.6 CM
BH CV ECHO MEAS - LVPWD: 0.97 CM
BH CV ECHO MEAS - MED PEAK E' VEL: 7.8 CM/SEC
BH CV ECHO MEAS - MV A MAX VEL: 47 CM/SEC
BH CV ECHO MEAS - MV DEC SLOPE: 313.1 CM/SEC2
BH CV ECHO MEAS - MV DEC TIME: 0.29 MSEC
BH CV ECHO MEAS - MV E MAX VEL: 59.2 CM/SEC
BH CV ECHO MEAS - MV E/A: 1.26
BH CV ECHO MEAS - MV MAX PG: 2.39 MMHG
BH CV ECHO MEAS - MV MEAN PG: 1.18 MMHG
BH CV ECHO MEAS - MV P1/2T: 70.5 MSEC
BH CV ECHO MEAS - MV V2 VTI: 25.4 CM
BH CV ECHO MEAS - MVA(P1/2T): 3.1 CM2
BH CV ECHO MEAS - PA V2 MAX: 94.7 CM/SEC
BH CV ECHO MEAS - RV MAX PG: 1.77 MMHG
BH CV ECHO MEAS - RV V1 MAX: 66.5 CM/SEC
BH CV ECHO MEAS - RV V1 VTI: 14.4 CM
BH CV ECHO MEAS - RVDD: 2.7 CM
BH CV ECHO MEAS - TAPSE (>1.6): 2.24 CM
BH CV ECHO MEASUREMENTS AVERAGE E/E' RATIO: 7.09
BH CV XLRA - TDI S': 10.8 CM/SEC
MAXIMAL PREDICTED HEART RATE: 178 BPM
SINUS: 2.5 CM
STJ: 2.3 CM
STRESS TARGET HR: 151 BPM

## 2022-07-05 ENCOUNTER — TELEPHONE (OUTPATIENT)
Dept: CARDIOLOGY | Facility: CLINIC | Age: 42
End: 2022-07-05

## 2022-07-05 NOTE — TELEPHONE ENCOUNTER
STRESS/ECHO  Pt notified of no acute findings. Provider will discuss results at f/u. Pt reminded of appt date and time.  ----- Message from Bela Dick MA sent at 7/1/2022 11:31 AM EDT -----  Regarding: FW:    ----- Message -----  From: Bigg Alcaraz APRN  Sent: 7/1/2022   8:14 AM EDT  To: Bela Dick MA  Subject: FW:                                              Normal stress test keep follow up  ----- Message -----  From: Michael Jorge MD  Sent: 6/30/2022  11:16 PM EDT  To: YANCY Negrete

## 2022-07-13 DIAGNOSIS — F41.9 ANXIETY: ICD-10-CM

## 2022-07-13 RX ORDER — ESCITALOPRAM OXALATE 10 MG/1
10 TABLET ORAL DAILY
Qty: 30 TABLET | Refills: 0 | Status: SHIPPED | OUTPATIENT
Start: 2022-07-13 | End: 2022-08-15

## 2022-08-12 DIAGNOSIS — F41.9 ANXIETY: ICD-10-CM

## 2022-08-15 DIAGNOSIS — F41.9 ANXIETY: ICD-10-CM

## 2022-08-15 RX ORDER — ESCITALOPRAM OXALATE 10 MG/1
10 TABLET ORAL DAILY
Qty: 90 TABLET | Refills: 3 | Status: SHIPPED | OUTPATIENT
Start: 2022-08-15

## 2022-08-15 RX ORDER — ESCITALOPRAM OXALATE 10 MG/1
10 TABLET ORAL DAILY
Qty: 30 TABLET | Refills: 0 | Status: SHIPPED | OUTPATIENT
Start: 2022-08-15 | End: 2022-08-15